# Patient Record
Sex: MALE | Race: AMERICAN INDIAN OR ALASKA NATIVE
[De-identification: names, ages, dates, MRNs, and addresses within clinical notes are randomized per-mention and may not be internally consistent; named-entity substitution may affect disease eponyms.]

---

## 2021-06-14 ENCOUNTER — HOSPITAL ENCOUNTER (INPATIENT)
Dept: HOSPITAL 5 - ED | Age: 21
LOS: 4 days | Discharge: HOME | DRG: 208 | End: 2021-06-18
Attending: FAMILY MEDICINE | Admitting: HOSPITALIST
Payer: SELF-PAY

## 2021-06-14 DIAGNOSIS — E87.8: ICD-10-CM

## 2021-06-14 DIAGNOSIS — J96.01: Primary | ICD-10-CM

## 2021-06-14 DIAGNOSIS — Z20.822: ICD-10-CM

## 2021-06-14 DIAGNOSIS — R73.9: ICD-10-CM

## 2021-06-14 DIAGNOSIS — D72.829: ICD-10-CM

## 2021-06-14 DIAGNOSIS — G40.901: ICD-10-CM

## 2021-06-14 DIAGNOSIS — M62.82: ICD-10-CM

## 2021-06-14 DIAGNOSIS — F12.10: ICD-10-CM

## 2021-06-14 DIAGNOSIS — E87.2: ICD-10-CM

## 2021-06-14 DIAGNOSIS — Z87.820: ICD-10-CM

## 2021-06-14 DIAGNOSIS — N17.9: ICD-10-CM

## 2021-06-14 PROCEDURE — U0003 INFECTIOUS AGENT DETECTION BY NUCLEIC ACID (DNA OR RNA); SEVERE ACUTE RESPIRATORY SYNDROME CORONAVIRUS 2 (SARS-COV-2) (CORONAVIRUS DISEASE [COVID-19]), AMPLIFIED PROBE TECHNIQUE, MAKING USE OF HIGH THROUGHPUT TECHNOLOGIES AS DESCRIBED BY CMS-2020-01-R: HCPCS

## 2021-06-14 PROCEDURE — 80307 DRUG TEST PRSMV CHEM ANLYZR: CPT

## 2021-06-14 PROCEDURE — 95819 EEG AWAKE AND ASLEEP: CPT

## 2021-06-14 PROCEDURE — 70551 MRI BRAIN STEM W/O DYE: CPT

## 2021-06-14 PROCEDURE — 85025 COMPLETE CBC W/AUTO DIFF WBC: CPT

## 2021-06-14 PROCEDURE — 94640 AIRWAY INHALATION TREATMENT: CPT

## 2021-06-14 PROCEDURE — 81001 URINALYSIS AUTO W/SCOPE: CPT

## 2021-06-14 PROCEDURE — 80320 DRUG SCREEN QUANTALCOHOLS: CPT

## 2021-06-14 PROCEDURE — 80053 COMPREHEN METABOLIC PANEL: CPT

## 2021-06-14 PROCEDURE — 85007 BL SMEAR W/DIFF WBC COUNT: CPT

## 2021-06-14 PROCEDURE — 80048 BASIC METABOLIC PNL TOTAL CA: CPT

## 2021-06-14 PROCEDURE — 70450 CT HEAD/BRAIN W/O DYE: CPT

## 2021-06-14 PROCEDURE — 84443 ASSAY THYROID STIM HORMONE: CPT

## 2021-06-14 PROCEDURE — 85027 COMPLETE CBC AUTOMATED: CPT

## 2021-06-14 PROCEDURE — 93005 ELECTROCARDIOGRAM TRACING: CPT

## 2021-06-14 PROCEDURE — 94002 VENT MGMT INPAT INIT DAY: CPT

## 2021-06-14 PROCEDURE — 82550 ASSAY OF CK (CPK): CPT

## 2021-06-14 PROCEDURE — 71045 X-RAY EXAM CHEST 1 VIEW: CPT

## 2021-06-14 PROCEDURE — 74018 RADEX ABDOMEN 1 VIEW: CPT

## 2021-06-14 PROCEDURE — 36415 COLL VENOUS BLD VENIPUNCTURE: CPT

## 2021-06-14 PROCEDURE — 82962 GLUCOSE BLOOD TEST: CPT

## 2021-06-14 PROCEDURE — 94003 VENT MGMT INPAT SUBQ DAY: CPT

## 2021-06-14 PROCEDURE — 82803 BLOOD GASES ANY COMBINATION: CPT

## 2021-06-14 PROCEDURE — 82805 BLOOD GASES W/O2 SATURATION: CPT

## 2021-06-14 PROCEDURE — 36600 WITHDRAWAL OF ARTERIAL BLOOD: CPT

## 2021-06-14 PROCEDURE — G0480 DRUG TEST DEF 1-7 CLASSES: HCPCS
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
## 2021-06-15 LAB
%HYPO/RBC NFR BLD AUTO: (no result) %
ALBUMIN SERPL-MCNC: 4.8 G/DL (ref 3.9–5)
ALT SERPL-CCNC: 23 UNITS/L (ref 7–56)
ANISOCYTOSIS BLD QL SMEAR: (no result)
BACTERIA #/AREA URNS HPF: (no result) /HPF
BAND NEUTROPHILS # (MANUAL): 0 K/MM3
BILIRUB UR QL STRIP: (no result)
BLOOD UR QL VISUAL: (no result)
BUN SERPL-MCNC: 8 MG/DL (ref 9–20)
BUN SERPL-MCNC: 8 MG/DL (ref 9–20)
BUN/CREAT SERPL: 4 %
BUN/CREAT SERPL: 6 %
CALCIUM SERPL-MCNC: 9 MG/DL (ref 8.4–10.2)
CALCIUM SERPL-MCNC: 9.2 MG/DL (ref 8.4–10.2)
HCO3 BLDA-SCNC: 20.9 MMOL/L (ref 20–26)
HCT VFR BLD CALC: 38.7 % (ref 35.5–45.6)
HCT VFR BLD CALC: 38.8 % (ref 35.5–45.6)
HEMOLYSIS INDEX: 26
HEMOLYSIS INDEX: 6
HGB BLD-MCNC: 11.3 GM/DL (ref 11.8–15.2)
HGB BLD-MCNC: 12.2 GM/DL (ref 11.8–15.2)
MCHC RBC AUTO-ENTMCNC: 29 % (ref 32–34)
MCHC RBC AUTO-ENTMCNC: 32 % (ref 32–34)
MCV RBC AUTO: 80 FL (ref 84–94)
MCV RBC AUTO: 88 FL (ref 84–94)
MUCOUS THREADS #/AREA URNS HPF: (no result) /HPF
MYELOCYTES # (MANUAL): 0 K/MM3
PCO2 BLDA: 34 MM HG
PH BLDA: 7.41 PH UNITS (ref 7.35–7.45)
PH UR STRIP: 6 [PH] (ref 5–7)
PLATELET # BLD: 211 K/MM3 (ref 140–440)
PLATELET # BLD: 334 K/MM3 (ref 140–440)
PO2 BLDA: 125.7 MM HG (ref 80–90)
PROMYELOCYTES # (MANUAL): 0 K/MM3
PROT UR STRIP-MCNC: (no result) MG/DL
RBC # BLD AUTO: 4.39 M/MM3 (ref 3.65–5.03)
RBC # BLD AUTO: 4.83 M/MM3 (ref 3.65–5.03)
RBC #/AREA URNS HPF: 4 /HPF (ref 0–6)
TOTAL CELLS COUNTED BLD: 100
TRIPLE PHOSPHATE CRYSTAL,URINE: (no result)
UROBILINOGEN UR-MCNC: < 2 MG/DL (ref ?–2)
WBC #/AREA URNS HPF: 2 /HPF (ref 0–6)

## 2021-06-15 PROCEDURE — 4A033R1 MEASUREMENT OF ARTERIAL SATURATION, PERIPHERAL, PERCUTANEOUS APPROACH: ICD-10-PCS | Performed by: HOSPITALIST

## 2021-06-15 PROCEDURE — 0BH17EZ INSERTION OF ENDOTRACHEAL AIRWAY INTO TRACHEA, VIA NATURAL OR ARTIFICIAL OPENING: ICD-10-PCS | Performed by: HOSPITALIST

## 2021-06-15 PROCEDURE — 5A1945Z RESPIRATORY VENTILATION, 24-96 CONSECUTIVE HOURS: ICD-10-PCS | Performed by: HOSPITALIST

## 2021-06-15 RX ADMIN — HEPARIN SODIUM SCH UNIT: 5000 INJECTION, SOLUTION INTRAVENOUS; SUBCUTANEOUS at 10:09

## 2021-06-15 RX ADMIN — Medication SCH ML: at 21:12

## 2021-06-15 RX ADMIN — INSULIN HUMAN SCH: 100 INJECTION, SOLUTION PARENTERAL at 10:08

## 2021-06-15 RX ADMIN — FAMOTIDINE SCH MG: 10 INJECTION, SOLUTION INTRAVENOUS at 10:07

## 2021-06-15 RX ADMIN — FAMOTIDINE SCH MG: 10 INJECTION, SOLUTION INTRAVENOUS at 21:10

## 2021-06-15 RX ADMIN — IPRATROPIUM BROMIDE AND ALBUTEROL SULFATE SCH AMPUL: .5; 3 SOLUTION RESPIRATORY (INHALATION) at 08:09

## 2021-06-15 RX ADMIN — IPRATROPIUM BROMIDE AND ALBUTEROL SULFATE SCH AMPUL: .5; 3 SOLUTION RESPIRATORY (INHALATION) at 19:58

## 2021-06-15 RX ADMIN — LEVETIRACETAM SCH MLS/HR: 100 INJECTION, SOLUTION INTRAVENOUS at 21:12

## 2021-06-15 RX ADMIN — IPRATROPIUM BROMIDE AND ALBUTEROL SULFATE SCH AMPUL: .5; 3 SOLUTION RESPIRATORY (INHALATION) at 15:18

## 2021-06-15 RX ADMIN — HEPARIN SODIUM SCH UNIT: 5000 INJECTION, SOLUTION INTRAVENOUS; SUBCUTANEOUS at 15:57

## 2021-06-15 RX ADMIN — HEPARIN SODIUM SCH UNIT: 5000 INJECTION, SOLUTION INTRAVENOUS; SUBCUTANEOUS at 21:10

## 2021-06-15 RX ADMIN — INSULIN HUMAN SCH: 100 INJECTION, SOLUTION PARENTERAL at 14:12

## 2021-06-15 RX ADMIN — Medication SCH ML: at 10:33

## 2021-06-15 RX ADMIN — INSULIN HUMAN SCH: 100 INJECTION, SOLUTION PARENTERAL at 18:38

## 2021-06-15 RX ADMIN — LEVETIRACETAM SCH MLS/HR: 100 INJECTION, SOLUTION INTRAVENOUS at 15:57

## 2021-06-15 NOTE — CONSULTATION
History of Present Illness


Consult date: 06/15/21


Requesting physician: RICARDO BAHENA


Reason for consult: hypoxemia, other (status epilepticus)


History of present illness: 





Patient is intubated and sedated so all history comes from the chart.  


19 y/o male with prior history of seizure and TBI (cause unknown) was found down

at home seizing by his mother and continued to seize until EMS got there.  

Aborted at that time with ativan.  Per report was hypoxic in the field so bagged

here.   Once in the ED sats only in the low 90's and ED felt patient needed 

intubation for airway protection.  Sedated with Diprovan and taken to CT.  While

in CT, per nurse, patient continued to seize, on Diprovan and required and 

additional 4 of Ativan to abort.  He was then given Phosphenytoin on top of the 

keppra load that he already recieved.  He is currently sedated and has not had 

any further seizure like activity since then.











Past History


Past Medical History: seizures, other (TBI)


Past Surgical History: Other (unable to obtain)


Social history: other (unable to obtain)


Family history: other (unable to obtain)





Medications and Allergies


                                    Allergies











Allergy/AdvReac Type Severity Reaction Status Date / Time


 


Unable to Assess Allergy   Unverified 08/03/20 11:05











Active Meds: 


Active Medications





Hydrophilic Ointment (Lip Therapy Vaseline)  1 applic TP Q2HR PRN


   PRN Reason: Dry Lips


Sodium Chloride (Nacl 0.9% 1000 Ml)  1,000 mls @ 125 mls/hr IV ONCE ONE


   Stop: 06/15/21 07:33


   Last Admin: 06/15/21 00:23 Dose:  125 mls/hr


   Documented by: 


Propofol (Diprivan 10 Mg/Ml)  1,000 mg in 100 mls @ 2.46 mls/hr IV TITR JO ANN; 

Protocol


   Last Titration: 06/15/21 00:15 Dose:  30 mcg/kg/min, 14.76 mls/hr


   Documented by: 


Multi-Ingred Cream/Lotion/Oil/Oint (Mineral Oil/Petrolatum, White Ophth Oint 3.5

Gm)  1 applic OU Q4HR PRN


   PRN Reason: Dry Eye(s)











Review of Systems


ROS unobtainable: due to endotracheal tube, due to mental status





Physical Examination


Vital signs: 


                                   Vital Signs











Pulse Ox


 


 98 


 


 06/14/21 23:20











General appearance: other (sedated)


ENT: other (orally intubated)


Neck: supple


Effort: normal


Ascultation: Bilateral: clear





Results





- Laboratory Findings


CBC and BMP: 


                                 06/14/21 23:44





                                 06/14/21 23:44


Abnormal lab findings: 


                                  Abnormal Labs











  06/14/21 06/14/21





  23:44 23:44


 


WBC  18.3 H 


 


Hgb  11.3 L 


 


MCH  26 L 


 


MCHC  29 L 


 


RDW  20.7 H 


 


Lymphocytes % (Manual)  36.0 H 


 


Monocytes % (Manual)  8.0 H 


 


Seg Neutrophils # Man  9.5 H 


 


Lymphocytes # (Manual)  6.6 H 


 


Monocytes # (Manual)  1.5 H 


 


Eosinophils # (Manual)  0.5 H 


 


Basophils # (Manual)  0.2 H 


 


Chloride   95.2 L


 


Carbon Dioxide   10 L


 


BUN   8 L


 


Creatinine   1.9 H


 


Glucose   255 H


 


AST   58 H


 


Total Creatine Kinase   1147 H














- Diagnostic Findings


Chest x-ray: image reviewed





Assessment and Plan





19 y/o with prior TBI and seizures admitted with status epilepticus and acute 

respiratory failure.





1.  Needs EEG on and off sedation if possible to make sure seizures have been 

aborted


2.  Neurology consult


3.  Need to speak with mother to get more history regarding meds, exposures, 

compliance etc


4.  RHONDA, likely volume related, or pending how long he was seizing.  Agree with 

volume and trend CK levels


5.  Guarded to poor prognosis.  Per nursing patient was awake but not sure 

exactly what type of wakeness they were dealing with.  Per documentation, 

patient could be have been seizing for an extended period of time.





CCT 31 minutes.

## 2021-06-15 NOTE — XRAY REPORT
CHEST 1 VIEW, 6/14/2021 10:56 PM 



CLINICAL INFORMATION/INDICATION: Endotracheal tube placement



COMPARISON: None.



FINDINGS:



SUPPORT DEVICES: The distal tip of the endotracheal tube is located approximately 5.5 cm above the le
kristin the fariha. An esophagogastric tube is also been placed with distal tip overlying the proximal st
omach.



HEART: The cardiac silhouette is normal in size.



LUNGS/PLEURA: The lungs are clear of focal airspace disease or significant pleural effusion.



ADDITIONAL FINDINGS: No additional acute findings.



IMPRESSION:

1. Placement of endotracheal tube and esophagogastric tube as above.



Signer Name: Jacklyn Ansari MD 

Signed: 6/15/2021 12:29 AM

Workstation Name: [id number]

## 2021-06-15 NOTE — XRAY REPORT
ABDOMEN 1 VIEW



INDICATION / CLINICAL INFORMATION:

NGT placement.



COMPARISON: 

Not available.



FINDINGS:



TUBES / LINES: An NG tube has been placed with the tip projecting over the gastric fundus and the miko
ehole of the tube located over the distal third of the esophagus.  

BOWEL GAS PATTERN: No dilated bowel loops are seen. The colon contains a large amount of stool. 

FREE AIR / EXTRALUMINAL GAS: None seen.



ADDITIONAL FINDINGS: No significant additional findings.



IMPRESSION:

NG tube as above. Advancement of the tube by 5 cm should result in the sidehole of the tube being loc
ated in the stomach.



Signer Name: Eugenio Shepherd MD 

Signed: 6/15/2021 6:04 PM

Workstation Name: [email or web address]

## 2021-06-15 NOTE — CAT SCAN REPORT
Examination: CT of the head without contrast



Clinical information: Seizure. Altered mental status.



Comparison: None



Technical: Multiple axial CT images of the head were obtained without intravenous contrast.  Sagittal
 and coronal reformats were obtained.  All CTs at this facility utilize dose reduction techniques inc
luding automated exposure control, iterative reconstruction and weight based dosing when appropriate 
to reduce patient radiation dose to as low as reasonable achievable.



Findings: 



INTRACRANIAL CONTENTS: There are confluent regions of hypodensity and associated volume loss within t
he bilateral frontal lobes. There is no CT evidence of acute intracranial hemorrhage. There is no zandra
dence of mass effect or midline shift. The ventricular system is normal in size and configuration. 



SKULL: Evaluation of the skull demonstrates previous right frontal convexity craniectomy



ORBITS: The bilateral orbits and globes appear normal



PARANASAL SINUSES / MASTOID AIR CELLS: Paranasal sinuses and mastoid air cells appear clear.



There is a low-density fluid collection soft tissue fluid collection surrounding the right craniectom
y site measuring a maximum of 8 mm in thickness.



Impression:

1.  Confluent regions of hypodensity and volume loss within the bilateral frontal lobes. Findings may
 be secondary to previous trauma or ischemia. Please correlate with patient's clinical circumstances.


2. Postsurgical changes from right convexity craniectomy.



Signer Name: Jacklyn Ansari MD 

Signed: 6/15/2021 3:58 AM

Workstation Name: [id number]

## 2021-06-15 NOTE — XRAY REPORT
CHEST - 1 VIEW 0713 hours



INDICATION:  follow up respiratory failure 



COMPARISON:

Yesterday



FINDINGS:



Support devices:  Stable positioning of the endotracheal tube and nasogastric tube

Heart:  Normal

Lungs/pleura:  Left basilar atelectatic changes have nearly resolved. The lungs are clear otherwise.

Additional findings:  None.



IMPRESSION:  



Near normal AP chest. Minor left basilar atelectasis remains.



Signer Name: Román Woodard Jr, MD 

Signed: 6/15/2021 8:18 AM

Workstation Name: [id number]

## 2021-06-15 NOTE — CONSULTATION
History of Present Illness


Consult date: 06/15/21


Reason for Consult: Status Epilepticus


History of present illness: 





Hypoxemia


Seizure


History of present illness: 





20-year-old -American male with past medical history of seizure and TBI 

was brought to the emergency room because of prolonged seizure activity.  His 

mother came home to find the patient seizing and called for EMS.  He continued 

to seize until EMS arrived and for at least 5 minutes afterwards.  EMS gave the 

patient 2 mg of lorazepam and the seizures stopped.  However the patient had a 

room air pulse ox of about 75% and was exhibiting some shallow breathing so they

began doing bag valve ventilation.  He had an Accu-Chek in route that was about 

200.    


When the patient arrived to the emergency department he was brought into bed #1.

 Without bag valve ventilation the patient's oxygen saturation goes down into 

the low 90s and he has bradypnea with shallow respirations.  The patient was 

intubated for protection of airway.





he is currently intubated sedated with Propofol 30 Mc 


No witnessed seizure 








 Past History 


Past Medical History: seizures, other (TBI)


Past Surgical History: Other (unable to obtain)


Social history: other (unable to obtain)


Family history: other (unable to obtain)





 Medications and Allergies 


                                    Allergies











Allergy/AdvReac Type Severity Reaction Status Date / Time


 


Unable to Assess Allergy   Unverified 08/03/20 11:05











Active Meds: 


Active Medications





Acetaminophen (Acetaminophen 325 Mg Tab)  650 mg PO Q4H PRN


   PRN Reason: Pain MILD(1-3)/Fever >100.5/HA


Albuterol (Albuterol 2.5 Mg/3 Ml Nebu)  2.5 mg IH Q4HRT PRN


   PRN Reason: Shortness Of Breath


Albuterol/Ipratropium (Ipratropium/Albuterol Sulfate 3 Ml Ampul.Neb)  1 ampul IH

Q6HRT JO ANN


Hydrophilic Ointment (Lip Therapy Vaseline)  1 applic TP Q2HR PRN


   PRN Reason: Dry Lips


Sodium Chloride (Nacl 0.9% 1000 Ml)  1,000 mls @ 125 mls/hr IV ONCE ONE


   Stop: 06/15/21 07:33


   Last Admin: 06/15/21 00:23 Dose:  125 mls/hr


   Documented by: 


Propofol (Diprivan 10 Mg/Ml)  1,000 mg in 100 mls @ 2.46 mls/hr IV TITR JO ANN; 

Protocol


   Last Titration: 06/15/21 00:15 Dose:  30 mcg/kg/min, 14.76 mls/hr


   Documented by: 


Multi-Ingred Cream/Lotion/Oil/Oint (Mineral Oil/Petrolatum, White Ophth Oint 3.5

Gm)  1 applic OU Q4HR PRN


   PRN Reason: Dry Eye(s)


Ondansetron HCl (Ondansetron 4 Mg/2 Ml Inj)  4 mg IV Q8H PRN


   PRN Reason: Nausea And Vomiting


Sodium Chloride (Sodium Chloride 0.9% 10 Ml Flush Syringe)  10 ml IV BID JO ANN


Sodium Chloride (Sodium Chloride 0.9% 10 Ml Flush Syringe)  10 ml IV PRN PRN


   PRN Reason: LINE FLUSH











 Review of Systems 


Respiratory: shortness of breath, dyspnea on exertion


Neurological: seizures











Past History


Past Medical History: seizures, other (TBI)


Past Surgical History: Other (unable to obtain)


Social history: other (unable to obtain)


Family history: other (unable to obtain)





Medications and Allergies


                                    Allergies











Allergy/AdvReac Type Severity Reaction Status Date / Time


 


Unable to Assess Allergy   Unverified 08/03/20 11:05











                                Home Medications











 Medication  Instructions  Recorded  Confirmed  Last Taken  Type


 


No Known Home Medications [No  06/15/21 06/15/21 Unknown History





Reported Home Medications]     











Active Meds: 


Active Medications





Acetaminophen (Acetaminophen 325 Mg Tab)  650 mg PO Q4H PRN


   PRN Reason: Pain MILD(1-3)/Fever >100.5/HA


Albuterol (Albuterol 2.5 Mg/3 Ml Nebu)  2.5 mg IH Q4HRT PRN


   PRN Reason: Shortness Of Breath


Albuterol/Ipratropium (Ipratropium/Albuterol Sulfate 3 Ml Ampul.Neb)  1 ampul IH

Q6HRT JO ANN


Famotidine (Famotidine 20 Mg/2 Ml Inj)  20 mg IV QAM JO ANN


Heparin Sodium (Porcine) (Heparin 5,000 Unit/1 Ml Vial)  5,000 unit SUB-Q Q8HR 

JO ANN


Hydralazine HCl (Hydralazine 20 Mg/1 Ml Inj)  10 mg IV Q6H PRN


   PRN Reason: htn


Hydrophilic Ointment (Lip Therapy Vaseline)  1 applic TP Q2HR PRN


   PRN Reason: Dry Lips


Propofol (Diprivan 10 Mg/Ml)  1,000 mg in 100 mls @ 2.46 mls/hr IV TITR JO ANN; 

Protocol


   Last Titration: 06/15/21 00:15 Dose:  30 mcg/kg/min, 14.76 mls/hr


   Documented by: 


Dextrose/Sodium Chloride (D5ns)  1,000 mls @ 100 mls/hr IV AS DIRECT JO ANN


Levetiracetam 500 mg/ Dextrose  105 mls @ 400 mls/hr IV Q12HR JO ANN


Multi-Ingred Cream/Lotion/Oil/Oint (Mineral Oil/Petrolatum, White Ophth Oint 3.5

Gm)  1 applic OU Q4HR PRN


   PRN Reason: Dry Eye(s)


Ondansetron HCl (Ondansetron 4 Mg/2 Ml Inj)  4 mg IV Q8H PRN


   PRN Reason: Nausea And Vomiting


Sodium Chloride (Sodium Chloride 0.9% 10 Ml Flush Syringe)  10 ml IV BID JO ANN


Sodium Chloride (Sodium Chloride 0.9% 10 Ml Flush Syringe)  10 ml IV PRN PRN


   PRN Reason: LINE FLUSH











Physical Examination





- Vital Signs


Vital Signs: 


                                   Vital Signs











Pulse Ox


 


 98 


 


 06/14/21 23:20














- Constitutional


General appearance: comfortable





- EENT


EENT: Present: PERRL





- Respiratory


Respiratory: Present: chest non-tender, lungs clear, rhonchi





- Cardiovascular


Cardiovascular: Present: regular rate, normal S1, normal S2


Extremities: Present: no peripheral edema bilatateraly





- Gastrointestinal


Gastrointestinal: Present: normoactive bowel sounds





- Integumentary


Integumentary: Present: normal





- Neurologic


Cranial nerve examination: PERRL, EOMI, intact


Speech examination: other (intubated and sedated)


Sensorimotor examination: intact


Detailed motor examination: other (slight withdrawal to pain stimuli)





Results





- Laboratory Findings


CBC and BMP: 


                                 06/15/21 10:41





                                 06/15/21 10:41


Abnormal Lab Findings: 


                                  Abnormal Labs











  06/14/21 06/14/21 06/15/21





  23:44 23:44 Unknown


 


WBC  18.3 H  


 


Hgb  11.3 L  


 


MCH  26 L  


 


MCHC  29 L  


 


RDW  20.7 H  


 


Lymphocytes % (Manual)  36.0 H  


 


Monocytes % (Manual)  8.0 H  


 


Seg Neutrophils # Man  9.5 H  


 


Lymphocytes # (Manual)  6.6 H  


 


Monocytes # (Manual)  1.5 H  


 


Eosinophils # (Manual)  0.5 H  


 


Basophils # (Manual)  0.2 H  


 


ABG pO2    125.7 H


 


ABG Base Excess    -3.2 L


 


ABG Hemoglobin    10.6 L


 


Chloride   95.2 L 


 


Carbon Dioxide   10 L 


 


BUN   8 L 


 


Creatinine   1.9 H 


 


Glucose   255 H 


 


AST   58 H 


 


Total Creatine Kinase   1147 H 














Assessment and Plan





 Assessment and Plan 


# Acute respiratory failure


-whether with hypoxia or hypercapnia   


- related to seizure R/O infection


-Admit the patient to the ICU.  Patient is status post intubation.  DuoNeb by 

nebulizer every 4 hours.  Albuterol by nebulizer every 4 hours as needed.  

Patient is seen and evaluated by pulmonary








# Status epilepticus


-Keppra 1000 mg IV x1 dose 


-then 1000 mg IV every 12 hours. 


- Patient also get fosphenytoin 6100 g IV x1 dose. 


- EEG today. 


- Ativan as needed. 


- We also order MRI of the brain w/o IV contrast. 


-Ct brain showed bifrontal hypodensity 





# History of traumatic brain injury


-Stable. 





# Hyperglycemia


-We will monitor the glucose closely.  





# DVT prophylaxis


-Heparin 5000 units subcu every 8 hours for DVT prophylaxis. 


- Pepcid 20 mg IV twice daily for GI prophylaxis. 


- Patient is a full code.

## 2021-06-16 LAB
BASOPHILS # (AUTO): 0 K/MM3 (ref 0–0.1)
BASOPHILS NFR BLD AUTO: 0.3 % (ref 0–1.8)
BUN SERPL-MCNC: 8 MG/DL (ref 9–20)
BUN/CREAT SERPL: 8 %
CALCIUM SERPL-MCNC: 9.1 MG/DL (ref 8.4–10.2)
EOSINOPHIL # BLD AUTO: 0.1 K/MM3 (ref 0–0.4)
EOSINOPHIL NFR BLD AUTO: 1.1 % (ref 0–4.3)
HCT VFR BLD CALC: 34.6 % (ref 35.5–45.6)
HEMOLYSIS INDEX: 10
HGB BLD-MCNC: 11.1 GM/DL (ref 11.8–15.2)
LYMPHOCYTES # BLD AUTO: 0.9 K/MM3 (ref 1.2–5.4)
LYMPHOCYTES NFR BLD AUTO: 8.9 % (ref 13.4–35)
MCHC RBC AUTO-ENTMCNC: 32 % (ref 32–34)
MCV RBC AUTO: 81 FL (ref 84–94)
MONOCYTES # (AUTO): 1.1 K/MM3 (ref 0–0.8)
MONOCYTES % (AUTO): 11.1 % (ref 0–7.3)
PLATELET # BLD: 179 K/MM3 (ref 140–440)
RBC # BLD AUTO: 4.28 M/MM3 (ref 3.65–5.03)

## 2021-06-16 RX ADMIN — Medication SCH ML: at 23:38

## 2021-06-16 RX ADMIN — HALOPERIDOL LACTATE PRN MG: 5 INJECTION, SOLUTION INTRAMUSCULAR at 11:39

## 2021-06-16 RX ADMIN — LEVETIRACETAM SCH MLS/HR: 100 INJECTION, SOLUTION INTRAVENOUS at 11:01

## 2021-06-16 RX ADMIN — LEVETIRACETAM SCH MLS/HR: 100 INJECTION, SOLUTION INTRAVENOUS at 23:43

## 2021-06-16 RX ADMIN — INSULIN HUMAN SCH: 100 INJECTION, SOLUTION PARENTERAL at 00:00

## 2021-06-16 RX ADMIN — IPRATROPIUM BROMIDE AND ALBUTEROL SULFATE SCH: .5; 3 SOLUTION RESPIRATORY (INHALATION) at 21:57

## 2021-06-16 RX ADMIN — IPRATROPIUM BROMIDE AND ALBUTEROL SULFATE SCH AMPUL: .5; 3 SOLUTION RESPIRATORY (INHALATION) at 14:45

## 2021-06-16 RX ADMIN — INSULIN HUMAN SCH: 100 INJECTION, SOLUTION PARENTERAL at 13:36

## 2021-06-16 RX ADMIN — FAMOTIDINE SCH MG: 20 TABLET ORAL at 23:34

## 2021-06-16 RX ADMIN — Medication SCH ML: at 11:02

## 2021-06-16 RX ADMIN — HEPARIN SODIUM SCH UNIT: 5000 INJECTION, SOLUTION INTRAVENOUS; SUBCUTANEOUS at 14:13

## 2021-06-16 RX ADMIN — INSULIN HUMAN SCH: 100 INJECTION, SOLUTION PARENTERAL at 18:26

## 2021-06-16 RX ADMIN — INSULIN HUMAN SCH: 100 INJECTION, SOLUTION PARENTERAL at 06:13

## 2021-06-16 RX ADMIN — HEPARIN SODIUM SCH UNIT: 5000 INJECTION, SOLUTION INTRAVENOUS; SUBCUTANEOUS at 23:34

## 2021-06-16 RX ADMIN — HEPARIN SODIUM SCH UNIT: 5000 INJECTION, SOLUTION INTRAVENOUS; SUBCUTANEOUS at 06:12

## 2021-06-16 RX ADMIN — FAMOTIDINE SCH MG: 20 TABLET ORAL at 11:01

## 2021-06-16 RX ADMIN — IPRATROPIUM BROMIDE AND ALBUTEROL SULFATE SCH AMPUL: .5; 3 SOLUTION RESPIRATORY (INHALATION) at 05:21

## 2021-06-16 RX ADMIN — IPRATROPIUM BROMIDE AND ALBUTEROL SULFATE SCH AMPUL: .5; 3 SOLUTION RESPIRATORY (INHALATION) at 08:50

## 2021-06-16 NOTE — PROGRESS NOTE
<BETHANYNEGRATOVA HMynor - Last Filed: 06/17/21 07:19>





Assessment and Plan


Assessment and plan: 


This is a 20-year-old male with seizures and TBI was monitored for status 

epilepticus, acute respiratory failure, hyperglycemia, acute kidney injury and 

rhabdomyolysis





Status epilepticus


Acute hypoxic respiratory failure, extubated 6/16


Acute kidney injury, improving/resolved


Rhabdomyolysis


Leukocytosis (improved)


Hypochloremia (resolved)


Metabolic acidosis (resolved)


UDS positive for marijuana


COVID 19 PUI, ruled out


Hyperglycemia


Hx of TBI


Hx of Seizures





-CCM, neurology consulted, appreciate recommendations


-Keppra 1000 mg x 1, fosphenytoin 1600 mgx1 in the emergency department


-Keppra 1000 mg twice daily


-Pulmonary hygiene


-Haldol as needed


-Seizure and aspiration precautions


-Continue home lexapro


-Hydralazine PRN


-SSI, Accu-Cheks every 6


-6/14 CT head shows confluent regions of hypodensity and volume loss within the 

bilateral frontal lobes, findings may be secondary to previous trauma or 

ischemia, postsurgical changes from right convexity craniectomy, no evidence of 

mass-effect or midline shift, or acute intracranial hemorrhage


-6/15 MRI Brain pending


-6/16 EEG shows mildly abnormal be due to low voltage which is suggestive of 

encephalopathic process and/or postictal state and/or drug effect.  No epileptif

orm discharges


-6/15 CXR shows some minor left basilar atelectasis





DVT/GI prophylaxis: Heparin subcu, SCDs to bilateral lower extremities, PPI


Disposition: Transfer to floor








The high probability of a clinically significant, sudden or life threatening 

deterioration of the [multi] system(s) required my full and direct attention, 

intervention and personal management. The aggregate critical care time was [35] 

minutes. This time is in addition to time spent performing reported procedures 

but includes the following: 





[x] Data Review and interpretation 





[x] Patient assessment and monitoring of vital signs 





[x] Documentation 





[x] Medication orders and management





History


Interval history: 


This is a 20-year-old male with seizures and TBI presents the emergency 

department on 6/15 after being found down at home seizing by his mother and 

continued to seize until EMS arrival and patient was administered 2 mg of Ativan

 to report seizures.  Patient was hypoxic at this time with shallow breathing 

and was bagged.  On arrival to the emergency department patient was hypoxic to 

the low 90s with shallow respirations and bradypnea and was intubated for airway

 protection.  Patient was sedated with Diprivan and taken to CT and while the 

patient continued to seize and required additional 4 mg of Ativan to abort.  

Patient was loaded with Keppra and received fosphenytoin for continued seizure 

activity.  Lab work revealed leukocytosis, acute kidney injury, metabolic 

acidosis, hyperglycemia and rhabdomyolysis.  Patient was admitted to the 

hospital service with status epilepticus, acute respiratory failure, acute 

kidney injury, hyperglycemia and rhabdomyolysis with consults to [facility] and the [facility] and his 

neurologist.





6/15: Neurology was consulted this morning, MRI brain pending, Ntr consult 

placed. KUB ordered to start TF. EEG completed and per neurology it is remar

kable for diffuse slowing with no eplieptiform discharges noted.





6/16: At the time of examination patient sedated on propofol and on assist 

control with tidal volume 500, rate of 16, PEEP of 6 and 25% FiO2.  Patient was 

extubated in the afternoon.  Patient became aggressive and wanted to leave the 

hospital.  Patient was given Haldol IV and started on as needed Haldol.  Patient

 will be transferred to to the floor. RN to complete bedside swallow eval





Hospitalist Physical





- Constitutional


Vitals: 


                                        











Temp Pulse Resp BP Pulse Ox


 


 98.1 F   97 H  24   108/57   96 


 


 06/16/21 12:12  06/16/21 14:00  06/16/21 14:00  06/16/21 14:00  06/16/21 13:00











General appearance: Present: no acute distress, well-nourished, other (Traumatic

 cephalic)





- EENT


Eyes: Present: PERRL, EOM intact


ENT: clear oral mucosa





- Neck


Neck: Present: normal ROM





- Respiratory


Respiratory effort: normal


Respiratory: bilateral: CTA





- Cardiovascular


Rhythm: regular


Heart Sounds: Present: S1 & S2.  Absent: systolic murmur, diastolic murmur





- Extremities


Extremities: no ischemia, pulses intact, pulses symmetrical, No edema, normal 

temperature, normal color, Full ROM


Peripheral Pulses: within normal limits





- Abdominal


General gastrointestinal: soft, non-tender, non-distended, normal bowel sounds





- Integumentary


Integumentary: Present: warm, dry





- Psychiatric


Psychiatric: agitated





- Neurologic


Neurologic: moves all extremities





- Allied Health


Allied health notes reviewed: nursing, RT, social work





Results





- Labs


CBC & Chem 7: 


                                 06/16/21 06:32





                                 06/16/21 06:32


Labs: 


                             Laboratory Last Values











WBC  10.0 K/mm3 (4.5-11.0)   06/16/21  06:32    


 


RBC  4.28 M/mm3 (3.65-5.03)   06/16/21  06:32    


 


Hgb  11.1 gm/dl (11.8-15.2)  L  06/16/21  06:32    


 


Hct  34.6 % (35.5-45.6)  L  06/16/21  06:32    


 


MCV  81 fl (84-94)  L  06/16/21  06:32    


 


MCH  26 pg (28-32)  L  06/16/21  06:32    


 


MCHC  32 % (32-34)   06/16/21  06:32    


 


RDW  20.3 % (13.2-15.2)  H  06/16/21  06:32    


 


Plt Count  179 K/mm3 (140-440)   06/16/21  06:32    


 


Lymph % (Auto)  8.9 % (13.4-35.0)  L  06/16/21  06:32    


 


Mono % (Auto)  11.1 % (0.0-7.3)  H  06/16/21  06:32    


 


Eos % (Auto)  1.1 % (0.0-4.3)   06/16/21  06:32    


 


Baso % (Auto)  0.3 % (0.0-1.8)   06/16/21  06:32    


 


Lymph # (Auto)  0.9 K/mm3 (1.2-5.4)  L  06/16/21  06:32    


 


Mono # (Auto)  1.1 K/mm3 (0.0-0.8)  H  06/16/21  06:32    


 


Eos # (Auto)  0.1 K/mm3 (0.0-0.4)   06/16/21  06:32    


 


Baso # (Auto)  0.0 K/mm3 (0.0-0.1)   06/16/21  06:32    


 


Add Manual Diff  Complete   06/14/21  23:44    


 


Total Counted  100   06/14/21  23:44    


 


Seg Neutrophils %  78.6 % (40.0-70.0)  H  06/16/21  06:32    


 


Seg Neuts % (Manual)  52.0 % (40.0-70.0)   06/14/21  23:44    


 


Lymphocytes % (Manual)  36.0 % (13.4-35.0)  H  06/14/21  23:44    


 


Monocytes % (Manual)  8.0 % (0.0-7.3)  H  06/14/21  23:44    


 


Eosinophils % (Manual)  3.0 % (0.0-4.3)   06/14/21  23:44    


 


Basophils % (Manual)  1.0 % (0.0-1.8)   06/14/21  23:44    


 


Nucleated RBC %  Not Reportable   06/14/21  23:44    


 


Seg Neutrophils #  7.9 K/mm3 (1.8-7.7)  H  06/16/21  06:32    


 


Seg Neutrophils # Man  9.5 K/mm3 (1.8-7.7)  H  06/14/21  23:44    


 


Band Neutrophils #  0.0 K/mm3  06/14/21  23:44    


 


Lymphocytes # (Manual)  6.6 K/mm3 (1.2-5.4)  H  06/14/21  23:44    


 


Abs React Lymphs (Man)  0.0 K/mm3  06/14/21  23:44    


 


Monocytes # (Manual)  1.5 K/mm3 (0.0-0.8)  H  06/14/21  23:44    


 


Eosinophils # (Manual)  0.5 K/mm3 (0.0-0.4)  H  06/14/21  23:44    


 


Basophils # (Manual)  0.2 K/mm3 (0.0-0.1)  H  06/14/21  23:44    


 


Metamyelocytes #  0.0 K/mm3  06/14/21  23:44    


 


Myelocytes #  0.0 K/mm3  06/14/21  23:44    


 


Promyelocytes #  0.0 K/mm3  06/14/21  23:44    


 


Blast Cells #  0.0 K/mm3  06/14/21  23:44    


 


WBC Morphology  Not Reportable   06/14/21  23:44    


 


Hypersegmented Neuts  Not Reportable   06/14/21  23:44    


 


Hyposegmented Neuts  Not Reportable   06/14/21  23:44    


 


Hypogranular Neuts  Not Reportable   06/14/21  23:44    


 


Smudge Cells  Not Reportable   06/14/21  23:44    


 


Toxic Granulation  Not Reportable   06/14/21  23:44    


 


Toxic Vacuolation  Not Reportable   06/14/21  23:44    


 


Dohle Bodies  Not Reportable   06/14/21  23:44    


 


Pelger-Huet Anomaly  Not Reportable   06/14/21  23:44    


 


Verona Rods  Not Reportable   06/14/21  23:44    


 


Platelet Estimate  Not Reportable   06/14/21  23:44    


 


Clumped Platelets  Not Reportable   06/14/21  23:44    


 


Plt Clumps, EDTA  Not Reportable   06/14/21  23:44    


 


Large Platelets  Not Reportable   06/14/21  23:44    


 


Giant Platelets  Not Reportable   06/14/21  23:44    


 


Platelet Satelliting  Not Reportable   06/14/21  23:44    


 


Plt Morphology Comment  Not Reportable   06/14/21  23:44    


 


RBC Morphology  Not Reportable   06/14/21  23:44    


 


Dimorphic RBCs  Not Reportable   06/14/21  23:44    


 


Polychromasia  Not Reportable   06/14/21  23:44    


 


Hypochromasia  1+   06/14/21  23:44    


 


Poikilocytosis  Not Reportable   06/14/21  23:44    


 


Anisocytosis  1+   06/14/21  23:44    


 


Microcytosis  Few   06/14/21  23:44    


 


Macrocytosis  Not Reportable   06/14/21  23:44    


 


Spherocytes  Not Reportable   06/14/21  23:44    


 


Pappenheimer Bodies  Not Reportable   06/14/21  23:44    


 


Sickle Cells  Not Reportable   06/14/21  23:44    


 


Target Cells  Not Reportable   06/14/21  23:44    


 


Tear Drop Cells  Not Reportable   06/14/21  23:44    


 


Ovalocytes  Not Reportable   06/14/21  23:44    


 


Helmet Cells  Not Reportable   06/14/21  23:44    


 


[place] Bodies  Not Reportable   06/14/21  23:44    


 


Cabot Rings  Not Reportable   06/14/21  23:44    


 


Brigid Cells  Not Reportable   06/14/21  23:44    


 


Bite Cells  Not Reportable   06/14/21  23:44    


 


Crenated Cell  Not Reportable   06/14/21  23:44    


 


Elliptocytes  Not Reportable   06/14/21  23:44    


 


Acanthocytes (Spur)  Not Reportable   06/14/21  23:44    


 


Rouleaux  Not Reportable   06/14/21  23:44    


 


Hemoglobin C Crystals  Not Reportable   06/14/21  23:44    


 


Schistocytes  Not Reportable   06/14/21  23:44    


 


Malaria parasites  Not Reportable   06/14/21  23:44    


 


Mann Bodies  Not Reportable   06/14/21  23:44    


 


Hem Pathologist Commnt  No   06/14/21  23:44    


 


ABG pH  7.406 pH Units (7.350-7.450)   06/15/21  Unknown


 


ABG pCO2  34.0 mm Hg  06/15/21  Unknown


 


ABG pO2  125.7 mm Hg (80.0-90.0)  H  06/15/21  Unknown


 


ABG HCO3  20.9 mmol/L (20.0-26.0)   06/15/21  Unknown


 


ABG O2 Saturation  98.5 % (95.0-99.0)   06/15/21  Unknown


 


ABG O2 Content  14.5  (0.0-44)   06/15/21  Unknown


 


ABG Base Excess  -3.2 mmol/L (-2.0-3.0)  L  06/15/21  Unknown


 


ABG Hemoglobin  10.6 gm/dl (14.0-18.0)  L  06/15/21  Unknown


 


ABG Carboxyhemoglobin  1.7 % (0.0-5.0)   06/15/21  Unknown


 


ABG Methemoglobin  0.5 % (0.0-1.5)   06/15/21  Unknown


 


Oxyhemoglobin  96.3 % (95.0-99.0)   06/15/21  Unknown


 


FiO2  21 %  06/15/21  Unknown


 


Sodium  142 mmol/L (137-145)   06/16/21  06:32    


 


Potassium  4.0 mmol/L (3.6-5.0)   06/16/21  06:32    


 


Chloride  105.9 mmol/L ()   06/16/21  06:32    


 


Carbon Dioxide  25 mmol/L (22-30)   06/16/21  06:32    


 


Anion Gap  15 mmol/L  06/16/21  06:32    


 


BUN  8 mg/dL (9-20)  L  06/16/21  06:32    


 


Creatinine  1.0 mg/dL (0.8-1.3)   06/16/21  06:32    


 


Estimated GFR  > 60 ml/min  06/16/21  06:32    


 


BUN/Creatinine Ratio  8 %  06/16/21  06:32    


 


Glucose  106 mg/dL ()  H  06/16/21  06:32    


 


POC Glucose  120 mg/dL ()  H  06/16/21  05:18    


 


Calcium  9.1 mg/dL (8.4-10.2)   06/16/21  06:32    


 


Total Bilirubin  0.40 mg/dL (0.1-1.2)   06/14/21  23:44    


 


AST  58 units/L (5-40)  H  06/14/21  23:44    


 


ALT  23 units/L (7-56)   06/14/21  23:44    


 


Alkaline Phosphatase  78 units/L ()   06/14/21  23:44    


 


Total Creatine Kinase  1147 units/L ()  H  06/14/21  23:44    


 


Total Protein  7.8 g/dL (6.3-8.2)   06/14/21  23:44    


 


Albumin  4.8 g/dL (3.9-5)   06/14/21  23:44    


 


Albumin/Globulin Ratio  1.6 %  06/14/21  23:44    


 


TSH  3.550 mlU/mL (0.270-4.200)   06/14/21  23:44    


 


Urine Color  Straw  (Yellow)   06/15/21  Unknown


 


Urine Turbidity  Slightly-cloudy  (Clear)   06/15/21  Unknown


 


Urine pH  6.0  (5.0-7.0)   06/15/21  Unknown


 


Ur Specific Gravity  1.006  (1.003-1.030)   06/15/21  Unknown


 


Urine Protein  <15 mg/dl mg/dL (Negative)   06/15/21  Unknown


 


Urine Glucose (UA)  Neg mg/dL (Negative)   06/15/21  Unknown


 


Urine Ketones  Neg mg/dL (Negative)   06/15/21  Unknown


 


Urine Blood  Mod  (Negative)   06/15/21  Unknown


 


Urine Nitrite  Neg  (Negative)   06/15/21  Unknown


 


Urine Bilirubin  Neg  (Negative)   06/15/21  Unknown


 


Urine Urobilinogen  < 2.0 mg/dL (<2.0)   06/15/21  Unknown


 


Ur Leukocyte Esterase  Neg  (Negative)   06/15/21  Unknown


 


Urine WBC (Auto)  2.0 /HPF (0.0-6.0)   06/15/21  Unknown


 


Urine RBC (Auto)  4.0 /HPF (0.0-6.0)   06/15/21  Unknown


 


Urine Bacteria (Auto)  1+ /HPF (Negative)   06/15/21  Unknown


 


Triple Phos Crystals  Few   06/15/21  Unknown


 


Urine Mucus  Few /HPF  06/15/21  Unknown


 


Urine Opiates Screen  Negative   06/15/21  Unknown


 


Urine Methadone Screen  Negative   06/15/21  Unknown


 


Ur Barbiturates Screen  Negative   06/15/21  Unknown


 


Ur Phencyclidine Scrn  Negative   06/15/21  Unknown


 


Ur Amphetamines Screen  Negative   06/15/21  Unknown


 


U Benzodiazepines Scrn  Negative   06/15/21  Unknown


 


Urine Cocaine Screen  Negative   06/15/21  Unknown


 


U Marijuana (THC) Screen  Presumptive positive   06/15/21  Unknown


 


Drugs of Abuse Note  Disclamer   06/15/21  Unknown


 


Plasma/Serum Alcohol  < 0.01 % (0-0.07)   06/14/21  23:44    


 


Coronavirus (PCR)  Negative  (Negative)   06/15/21  Unknown











Tom/IV: 


                                        





Voiding Method                   Indwelling Catheter











Active Medications





- Current Medications


Current Medications: 














Generic Name Dose Route Start Last Admin





  Trade Name Freq  PRN Reason Stop Dose Admin


 


Acetaminophen  650 mg  06/15/21 05:37 





  Acetaminophen 325 Mg Tab  PO  





  Q4H PRN  





  Pain MILD(1-3)/Fever >100.5/HA  


 


Albuterol  2.5 mg  06/15/21 05:37 





  Albuterol 2.5 Mg/3 Ml Nebu  IH  





  Q4HRT PRN  





  Shortness Of Breath  


 


Albuterol/Ipratropium  1 ampul  06/15/21 08:00  06/16/21 08:50





  Ipratropium/Albuterol Sulfate 3 Ml Ampul.Neb  IH   1 ampul





  Q6HRT JO ANN   Administration


 


Lipase/Protease/Amylase  1 each  06/15/21 12:05 





  Lipase 10,500/Protease 25,000/Amylase 43,750 (Units) Dr Esparza  FEEDTUBE  





  PRN PRN  





  For Clogged Feeding Tube  


 


Dextrose  50 ml  06/15/21 08:09 





  Dextrose 50% In Water (25gm) 50 Ml Syringe  IV  





  Q30MIN PRN  





  Hypoglycemia  





  Protocol  


 


Famotidine  20 mg  06/16/21 10:00  06/16/21 11:01





  Famotidine 20 Mg Tab  PO   20 mg





  BID JO ANN   Administration


 


Haloperidol Lactate  5 mg  06/16/21 11:19  06/16/21 11:39





  Haloperidol Lactate 5 Mg/1 Ml Inj  IV   5 mg





  Q6H PRN   Administration





  Agitation  


 


Heparin Sodium (Porcine)  5,000 unit  06/15/21 06:00  06/16/21 14:13





  Heparin 5,000 Unit/1 Ml Vial  SUB-Q   5,000 unit





  Q8HR JO ANN   Administration


 


Hydralazine HCl  10 mg  06/15/21 05:40 





  Hydralazine 20 Mg/1 Ml Inj  IV  





  Q6H PRN  





  htn  


 


Hydrophilic Ointment  1 applic  06/14/21 23:35 





  Lip Therapy Vaseline  TP  





  Q2HR PRN  





  Dry Lips  


 


Levetiracetam 1,000 mg/  110 mls @ 400 mls/hr  06/15/21 15:00  06/16/21 11:01





  Dextrose  IV   400 mls/hr





  Q12HR JO ANN   Administration


 


Insulin Human Regular  0 units  06/15/21 09:00  06/16/21 13:36





  Insulin Regular, Human 100 Units/1 Ml  SUB-Q   Not Given





  Q6HR [facility]  





  Protocol  


 


Lorazepam  2 mg  06/15/21 07:51  06/16/21 06:14





  Lorazepam 2 Mg/Ml Vial  IV   2 mg





  Q4H PRN   Administration





  AGITATION  


 


Miscellaneous Medication  5 mg  06/17/21 10:00 





  Lexapro  PO  





  DAILY [facility]  


 


Multi-Ingred Cream/Lotion/Oil/Oint  1 applic  06/14/21 23:35 





  Mineral Oil/Petrolatum, White Ophth Oint 3.5 Gm  OU  





  Q4HR PRN  





  Dry Eye(s)  


 


Ondansetron HCl  4 mg  06/15/21 05:37 





  Ondansetron 4 Mg/2 Ml Inj  IV  





  Q8H PRN  





  Nausea And Vomiting  


 


Simple Syrup  15 ml  06/15/21 12:05 





  Simple Syrup 15 Ml  FEEDTUBE  





  PRN PRN  





  Hypoglycemia  


 


Simple Syrup  30 ml  06/15/21 12:05 





  Simple Syrup 15 Ml  FEEDTUBE  





  PRN PRN  





  Hypoglycemia  


 


Sodium Bicarbonate  325 mg  06/15/21 12:05 





  Sodium Bicarbonate 325 Mg Tab  FEEDTUBE  





  PRN PRN  





  For Clogged Feeding Tube  


 


Sodium Chloride  10 ml  06/15/21 10:00  06/16/21 11:02





  Sodium Chloride 0.9% 10 Ml Flush Syringe  IV   10 ml





  BID JO ANN   Administration


 


Sodium Chloride  10 ml  06/15/21 05:37 





  Sodium Chloride 0.9% 10 Ml Flush Syringe  IV  





  PRN PRN  





  LINE FLUSH  














Nutrition/Malnutrition Assess





- Dietary Evaluation


Nutrition/Malnutrition Findings: 


                                        





Nutrition Notes                                            Start:  06/15/21 

07:32


Freq:                                                      Status: Active       

 


Protocol:                                                                       

 


 Document     06/15/21 07:32  MK  (Rec: 06/15/21 07:37    [id number])


 Nutrition Notes


     Need for Assessment generated from:         MD Order


     Initial or Follow up                        Assessment


     Current Diagnosis                           Respiratory Failure


     Other Pertinent Diagnosis                   seizure disorder, hx TBI


     Current Diet                                NPO


     Labs/Tests                                  BUN 8


                                                 Cr 1.9


                                                 


                                                 AST 58


     Pertinent Medications                       Propofol at 14.76 ml/hr


                                                 NS at 125 ml/hr


     Height                                      5 ft 10 in


     Weight                                      82 kg


     Ideal Body Weight (kg)                      75.45


     BMI                                         25.9


     Weight Status                               Appropriate


     Subjective/Other Information                MD order to evaluate


                                                 nutritional intake and for TPN


                                                 . Pt on vent and on hold in ED


                                                 . Per MD [place], TPN consult


                                                 an error; pt likely extubated


                                                 tomorrow, however, can start


                                                 TF.


     Burn                                        Absent


     Trauma                                      Absent


     Current % PO                                Negligible


     Minimum of two criteria                     No physical signs of


                                                 malnutrition


     #1


      Nutrition Diagnosis                        Inadequate oral intake


      Etiology                                   ARF


      As Evidenced by Signs and Symptoms         Pt on vent and unable to


                                                 consume PO


     Is patient on ventilator?                   Yes


     Is Patient Ambulatory and/or Out of Bed     No


     REE-([facility]-confined to bed)      [phone]


     Calculation Used for Recommendations        [facility]


     Additional Notes                            Protein: (1.2-2g/kg) 98-164g


                                                 Fluid: 1 ml/kcal


 Nutrition Intervention


     Change Diet Order:                          Start TF when able


     Nutrition Support:                          Osmolite 1.5 at 60ml/hr


                                                 Flush 175 ml q4h


     Kcal                                        2,160


     Protein (gm)                                90


     Carbohydrates (gm)                          293


     Fat (gm)                                    71


     Fluid (mL)                                  1,097


     Goal #1                                     Start TF or extubation


     Anticipated Discharge Needs:                Unable to determine at this


                                                 time


     Follow-Up By:                               06/17/21


     Additional Comments                         FU for TF start/tolerance or


                                                 extubation and BG, renal labs











<MICHEAL PEACE - Last Filed: 06/17/21 10:51>





Assessment and Plan


Assessment and plan: 





Agree with assessment and plan as outlined by nurse practitioner's note as 

above.  I have seen and examined the patient myself.  Patient very lethargic 

after extubation.  Patient is arousable.  Mother at the bedside, spoke with the 

mother pertaining to the patient's history.  Patient never discharged from the 

hospital with seizure medication.  Patient's mother states he has been having 

seizures even in his sleep.  Currently on Keppra, will continue, MRI is pending.

  Neurology following.  Patient is stable to be transferred to the floor.





Hospitalist Physical





- Constitutional


Vitals: 


                                        











Temp Pulse Resp BP Pulse Ox


 


 97.3 F L  81   18   108/62   99 


 


 06/17/21 08:04  06/17/21 08:04  06/17/21 08:04  06/17/21 08:04  06/17/21 08:04














Results





- Labs


CBC & Chem 7: 


                                 06/16/21 06:32





                                 06/16/21 06:32


Labs: 


                             Laboratory Last Values











WBC  10.0 K/mm3 (4.5-11.0)   06/16/21  06:32    


 


RBC  4.28 M/mm3 (3.65-5.03)   06/16/21  06:32    


 


Hgb  11.1 gm/dl (11.8-15.2)  L  06/16/21  06:32    


 


Hct  34.6 % (35.5-45.6)  L  06/16/21  06:32    


 


MCV  81 fl (84-94)  L  06/16/21  06:32    


 


MCH  26 pg (28-32)  L  06/16/21  06:32    


 


MCHC  32 % (32-34)   06/16/21  06:32    


 


RDW  20.3 % (13.2-15.2)  H  06/16/21  06:32    


 


Plt Count  179 K/mm3 (140-440)   06/16/21  06:32    


 


Lymph % (Auto)  8.9 % (13.4-35.0)  L  06/16/21  06:32    


 


Mono % (Auto)  11.1 % (0.0-7.3)  H  06/16/21  06:32    


 


Eos % (Auto)  1.1 % (0.0-4.3)   06/16/21  06:32    


 


Baso % (Auto)  0.3 % (0.0-1.8)   06/16/21  06:32    


 


Lymph # (Auto)  0.9 K/mm3 (1.2-5.4)  L  06/16/21  06:32    


 


Mono # (Auto)  1.1 K/mm3 (0.0-0.8)  H  06/16/21  06:32    


 


Eos # (Auto)  0.1 K/mm3 (0.0-0.4)   06/16/21  06:32    


 


Baso # (Auto)  0.0 K/mm3 (0.0-0.1)   06/16/21  06:32    


 


Add Manual Diff  Complete   06/14/21  23:44    


 


Total Counted  100   06/14/21  23:44    


 


Seg Neutrophils %  78.6 % (40.0-70.0)  H  06/16/21  06:32    


 


Seg Neuts % (Manual)  52.0 % (40.0-70.0)   06/14/21  23:44    


 


Lymphocytes % (Manual)  36.0 % (13.4-35.0)  H  06/14/21  23:44    


 


Monocytes % (Manual)  8.0 % (0.0-7.3)  H  06/14/21  23:44    


 


Eosinophils % (Manual)  3.0 % (0.0-4.3)   06/14/21  23:44    


 


Basophils % (Manual)  1.0 % (0.0-1.8)   06/14/21  23:44    


 


Nucleated RBC %  Not Reportable   06/14/21  23:44    


 


Seg Neutrophils #  7.9 K/mm3 (1.8-7.7)  H  06/16/21  06:32    


 


Seg Neutrophils # Man  9.5 K/mm3 (1.8-7.7)  H  06/14/21  23:44    


 


Band Neutrophils #  0.0 K/mm3  06/14/21  23:44    


 


Lymphocytes # (Manual)  6.6 K/mm3 (1.2-5.4)  H  06/14/21  23:44    


 


Abs React Lymphs (Man)  0.0 K/mm3  06/14/21  23:44    


 


Monocytes # (Manual)  1.5 K/mm3 (0.0-0.8)  H  06/14/21  23:44    


 


Eosinophils # (Manual)  0.5 K/mm3 (0.0-0.4)  H  06/14/21  23:44    


 


Basophils # (Manual)  0.2 K/mm3 (0.0-0.1)  H  06/14/21  23:44    


 


Metamyelocytes #  0.0 K/mm3  06/14/21  23:44    


 


Myelocytes #  0.0 K/mm3  06/14/21  23:44    


 


Promyelocytes #  0.0 K/mm3  06/14/21  23:44    


 


Blast Cells #  0.0 K/mm3  06/14/21  23:44    


 


WBC Morphology  Not Reportable   06/14/21  23:44    


 


Hypersegmented Neuts  Not Reportable   06/14/21  23:44    


 


Hyposegmented Neuts  Not Reportable   06/14/21  23:44    


 


Hypogranular Neuts  Not Reportable   06/14/21  23:44    


 


Smudge Cells  Not Reportable   06/14/21  23:44    


 


Toxic Granulation  Not Reportable   06/14/21  23:44    


 


Toxic Vacuolation  Not Reportable   06/14/21  23:44    


 


Dohle Bodies  Not Reportable   06/14/21  23:44    


 


Pelger-Huet Anomaly  Not Reportable   06/14/21  23:44    


 


[place] Rods  Not Reportable   06/14/21  23:44    


 


Platelet Estimate  Not Reportable   06/14/21  23:44    


 


Clumped Platelets  Not Reportable   06/14/21  23:44    


 


Plt Clumps, EDTA  Not Reportable   06/14/21  23:44    


 


Large Platelets  Not Reportable   06/14/21  23:44    


 


Giant Platelets  Not Reportable   06/14/21  23:44    


 


Platelet Satelliting  Not Reportable   06/14/21  23:44    


 


Plt Morphology Comment  Not Reportable   06/14/21  23:44    


 


RBC Morphology  Not Reportable   06/14/21  23:44    


 


Dimorphic RBCs  Not Reportable   06/14/21  23:44    


 


Polychromasia  Not Reportable   06/14/21  23:44    


 


Hypochromasia  1+   06/14/21  23:44    


 


Poikilocytosis  Not Reportable   06/14/21  23:44    


 


Anisocytosis  1+   06/14/21  23:44    


 


Microcytosis  Few   06/14/21  23:44    


 


Macrocytosis  Not Reportable   06/14/21  23:44    


 


Spherocytes  Not Reportable   06/14/21  23:44    


 


Pappenheimer Bodies  Not Reportable   06/14/21  23:44    


 


Sickle Cells  Not Reportable   06/14/21  23:44    


 


Target Cells  Not Reportable   06/14/21  23:44    


 


Tear Drop Cells  Not Reportable   06/14/21  23:44    


 


Ovalocytes  Not Reportable   06/14/21  23:44    


 


Helmet Cells  Not Reportable   06/14/21  23:44    


 


[place] Bodies  Not Reportable   06/14/21  23:44    


 


Cabot Rings  Not Reportable   06/14/21  23:44    


 


Brigid Cells  Not Reportable   06/14/21  23:44    


 


Bite Cells  Not Reportable   06/14/21  23:44    


 


Crenated Cell  Not Reportable   06/14/21  23:44    


 


Elliptocytes  Not Reportable   06/14/21  23:44    


 


Acanthocytes (Spur)  Not Reportable   06/14/21  23:44    


 


Rouleaux  Not Reportable   06/14/21  23:44    


 


Hemoglobin C Crystals  Not Reportable   06/14/21  23:44    


 


Schistocytes  Not Reportable   06/14/21  23:44    


 


Malaria parasites  Not Reportable   06/14/21  23:44    


 


Mann Bodies  Not Reportable   06/14/21  23:44    


 


Hem Pathologist Commnt  No   06/14/21  23:44    


 


ABG pH  7.406 pH Units (7.350-7.450)   06/15/21  Unknown


 


ABG pCO2  34.0 mm Hg  06/15/21  Unknown


 


ABG pO2  125.7 mm Hg (80.0-90.0)  H  06/15/21  Unknown


 


ABG HCO3  20.9 mmol/L (20.0-26.0)   06/15/21  Unknown


 


ABG O2 Saturation  98.5 % (95.0-99.0)   06/15/21  Unknown


 


ABG O2 Content  14.5  (0.0-44)   06/15/21  Unknown


 


ABG Base Excess  -3.2 mmol/L (-2.0-3.0)  L  06/15/21  Unknown


 


ABG Hemoglobin  10.6 gm/dl (14.0-18.0)  L  06/15/21  Unknown


 


ABG Carboxyhemoglobin  1.7 % (0.0-5.0)   06/15/21  Unknown


 


ABG Methemoglobin  0.5 % (0.0-1.5)   06/15/21  Unknown


 


Oxyhemoglobin  96.3 % (95.0-99.0)   06/15/21  Unknown


 


FiO2  21 %  06/15/21  Unknown


 


Sodium  142 mmol/L (137-145)   06/16/21  06:32    


 


Potassium  4.0 mmol/L (3.6-5.0)   06/16/21  06:32    


 


Chloride  105.9 mmol/L ()   06/16/21  06:32    


 


Carbon Dioxide  25 mmol/L (22-30)   06/16/21  06:32    


 


Anion Gap  15 mmol/L  06/16/21  06:32    


 


BUN  8 mg/dL (9-20)  L  06/16/21  06:32    


 


Creatinine  1.0 mg/dL (0.8-1.3)   06/16/21  06:32    


 


Estimated GFR  > 60 ml/min  06/16/21  06:32    


 


BUN/Creatinine Ratio  8 %  06/16/21  06:32    


 


Glucose  106 mg/dL ()  H  06/16/21  06:32    


 


POC Glucose  83 mg/dL ()   06/17/21  05:55    


 


Calcium  9.1 mg/dL (8.4-10.2)   06/16/21  06:32    


 


Total Bilirubin  0.40 mg/dL (0.1-1.2)   06/14/21  23:44    


 


AST  58 units/L (5-40)  H  06/14/21  23:44    


 


ALT  23 units/L (7-56)   06/14/21  23:44    


 


Alkaline Phosphatase  78 units/L ()   06/14/21  23:44    


 


Total Creatine Kinase  1147 units/L ()  H  06/14/21  23:44    


 


Total Protein  7.8 g/dL (6.3-8.2)   06/14/21  23:44    


 


Albumin  4.8 g/dL (3.9-5)   06/14/21  23:44    


 


Albumin/Globulin Ratio  1.6 %  06/14/21  23:44    


 


TSH  3.550 mlU/mL (0.270-4.200)   06/14/21  23:44    


 


Urine Color  Straw  (Yellow)   06/15/21  Unknown


 


Urine Turbidity  Slightly-cloudy  (Clear)   06/15/21  Unknown


 


Urine pH  6.0  (5.0-7.0)   06/15/21  Unknown


 


Ur Specific Gravity  1.006  (1.003-1.030)   06/15/21  Unknown


 


Urine Protein  <15 mg/dl mg/dL (Negative)   06/15/21  Unknown


 


Urine Glucose (UA)  Neg mg/dL (Negative)   06/15/21  Unknown


 


Urine Ketones  Neg mg/dL (Negative)   06/15/21  Unknown


 


Urine Blood  Mod  (Negative)   06/15/21  Unknown


 


Urine Nitrite  Neg  (Negative)   06/15/21  Unknown


 


Urine Bilirubin  Neg  (Negative)   06/15/21  Unknown


 


Urine Urobilinogen  < 2.0 mg/dL (<2.0)   06/15/21  Unknown


 


Ur Leukocyte Esterase  Neg  (Negative)   06/15/21  Unknown


 


Urine WBC (Auto)  2.0 /HPF (0.0-6.0)   06/15/21  Unknown


 


Urine RBC (Auto)  4.0 /HPF (0.0-6.0)   06/15/21  Unknown


 


Urine Bacteria (Auto)  1+ /HPF (Negative)   06/15/21  Unknown


 


Triple Phos Crystals  Few   06/15/21  Unknown


 


Urine Mucus  Few /HPF  06/15/21  Unknown


 


Urine Opiates Screen  Negative   06/15/21  Unknown


 


Urine Methadone Screen  Negative   06/15/21  Unknown


 


Ur Barbiturates Screen  Negative   06/15/21  Unknown


 


Ur Phencyclidine Scrn  Negative   06/15/21  Unknown


 


Ur Amphetamines Screen  Negative   06/15/21  Unknown


 


U Benzodiazepines Scrn  Negative   06/15/21  Unknown


 


Urine Cocaine Screen  Negative   06/15/21  Unknown


 


U Marijuana (THC) Screen  Presumptive positive   06/15/21  Unknown


 


Drugs of Abuse Note  Disclamer   06/15/21  Unknown


 


Plasma/Serum Alcohol  < 0.01 % (0-0.07)   06/14/21  23:44    


 


Coronavirus (PCR)  Negative  (Negative)   06/15/21  Unknown











Tom/IV: 


                                        





Voiding Method                   Incontinent











Active Medications





- Current Medications


Current Medications: 














Generic Name Dose Route Start Last Admin





  Trade Name Freq  PRN Reason Stop Dose Admin


 


Acetaminophen  650 mg  06/15/21 05:37 





  Acetaminophen 325 Mg Tab  PO  





  Q4H PRN  





  Pain MILD(1-3)/Fever >100.5/HA  


 


Albuterol  2.5 mg  06/15/21 05:37 





  Albuterol 2.5 Mg/3 Ml Nebu  IH  





  Q4HRT PRN  





  Shortness Of Breath  


 


Dextrose  50 ml  06/15/21 08:09 





  Dextrose 50% In Water (25gm) 50 Ml Syringe  IV  





  Q30MIN PRN  





  Hypoglycemia  





  Protocol  


 


Escitalopram Oxalate  5 mg  06/17/21 10:00  06/17/21 09:31





  Escitalopram 10 Mg Tab  PO   5 mg





  DAILY JO ANN   Administration


 


Famotidine  20 mg  06/16/21 10:00  06/17/21 09:31





  Famotidine 20 Mg Tab  PO   20 mg





  BID JO ANN   Administration


 


Haloperidol Lactate  5 mg  06/16/21 11:19  06/17/21 09:39





  Haloperidol Lactate 5 Mg/1 Ml Inj  IV   5 mg





  Q6H PRN   Administration





  Agitation  


 


Heparin Sodium (Porcine)  5,000 unit  06/15/21 06:00  06/17/21 06:10





  Heparin 5,000 Unit/1 Ml Vial  SUB-Q   5,000 unit





  Q8HR JO ANN   Administration


 


Hydralazine HCl  10 mg  06/15/21 05:40 





  Hydralazine 20 Mg/1 Ml Inj  IV  





  Q6H PRN  





  htn  


 


Hydrophilic Ointment  1 applic  06/14/21 23:35 





  Lip Therapy Vaseline  TP  





  Q2HR PRN  





  Dry Lips  


 


Levetiracetam 1,000 mg/  110 mls @ 400 mls/hr  06/15/21 15:00  06/17/21 10:05





  Dextrose  IV   400 mls/hr





  Q12HR JO ANN   Administration


 


Insulin Human Regular  0 units  06/15/21 09:00  06/17/21 06:08





  Insulin Regular, Human 100 Units/1 Ml  SUB-Q   Not Given





  Q6HR [facility]  





  Protocol  


 


Lorazepam  2 mg  06/15/21 07:51  06/16/21 23:49





  Lorazepam 2 Mg/Ml Vial  IV   2 mg





  Q4H PRN   Administration





  AGITATION  


 


Multi-Ingred Cream/Lotion/Oil/Oint  1 applic  06/14/21 23:35 





  Mineral Oil/Petrolatum, White Ophth Oint 3.5 Gm  OU  





  Q4HR PRN  





  Dry Eye(s)  


 


Ondansetron HCl  4 mg  06/15/21 05:37 





  Ondansetron 4 Mg/2 Ml Inj  IV  





  Q8H PRN  





  Nausea And Vomiting  


 


Sodium Chloride  10 ml  06/15/21 10:00  06/17/21 09:32





  Sodium Chloride 0.9% 10 Ml Flush Syringe  IV   10 ml





  BID JO ANN   Administration


 


Sodium Chloride  10 ml  06/15/21 05:37 





  Sodium Chloride 0.9% 10 Ml Flush Syringe  IV  





  PRN PRN  





  LINE FLUSH  














Nutrition/Malnutrition Assess





- Dietary Evaluation


Nutrition/Malnutrition Findings: 


                                        





Nutrition Notes                                            Start:  06/15/21 

07:32


Freq:                                                      Status: Active       

 


Protocol:                                                                       

 


 Document     06/15/21 07:32    (Rec: 06/15/21 07:37    [id number])


 Nutrition Notes


     Need for Assessment generated from:         MD Order


     Initial or Follow up                        Assessment


     Current Diagnosis                           Respiratory Failure


     Other Pertinent Diagnosis                   seizure disorder, hx TBI


     Current Diet                                NPO


     Labs/Tests                                  BUN 8


                                                 Cr 1.9


                                                 


                                                 AST 58


     Pertinent Medications                       Propofol at 14.76 ml/hr


                                                 NS at 125 ml/hr


     Height                                      5 ft 10 in


     Weight                                      82 kg


     Ideal Body Weight (kg)                      75.45


     BMI                                         25.9


     Weight Status                               Appropriate


     Subjective/Other Information                MD order to evaluate


                                                 nutritional intake and for TPN


                                                 . Pt on vent and on hold in ED


                                                 . Per MD [place], TPN consult


                                                 an error; pt likely extubated


                                                 tomorrow, however, can start


                                                 TF.


     Burn                                        Absent


     Trauma                                      Absent


     Current % PO                                Negligible


     Minimum of two criteria                     No physical signs of


                                                 malnutrition


     #1


      Nutrition Diagnosis                        Inadequate oral intake


      Etiology                                   ARF


      As Evidenced by Signs and Symptoms         Pt on vent and unable to


                                                 consume PO


     Is patient on ventilator?                   Yes


     Is Patient Ambulatory and/or Out of Bed     No


     REE-([facility]-confined to bed)      [id number]


     Calculation Used for Recommendations        [facility]


     Additional Notes                            Protein: (1.2-2g/kg) 98-164g


                                                 Fluid: 1 ml/kcal


 Nutrition Intervention


     Change Diet Order:                          Start TF when able


     Nutrition Support:                          Osmolite 1.5 at 60ml/hr


                                                 Flush 175 ml q4h


     Kcal                                        2,160


     Protein (gm)                                90


     Carbohydrates (gm)                          293


     Fat (gm)                                    71


     Fluid (mL)                                  1,097


     Goal #1                                     Start TF or extubation


     Anticipated Discharge Needs:                Unable to determine at this


                                                 time


     Follow-Up By:                               06/17/21


     Additional Comments                         FU for TF start/tolerance or


                                                 extubation and BG, renal labs

## 2021-06-16 NOTE — PROGRESS NOTE
Assessment and Plan





 Assessment and Plan 


# Acute respiratory failure


-Today more interactive moving all limbs no witnessed seizure since admission


-On Keppra 1000 mg bid


-MRI is pending


-EEG -- mild diffuse slowing 


-whether with hypoxia or hypercapnia   


- related to seizure R/O infection


-Admit the patient to the ICU.  Patient is status post intubation.  DuoNeb by 

nebulizer every 4 hours.  Albuterol by nebulizer every 4 hours as needed.  

Patient -is seen and evaluated by pulmonary


-








# Status epilepticus


-Keppra 1000 mg IV x1 dose 


-then 1000 mg IV every 12 hours. 


- Patient also get fosphenytoin 6100 g IV x1 dose. 


- EEG diffuse slowing


- Ativan as needed. 


- We also order MRI of the brain w/o IV contrast. 


-Ct brain showed bifrontal hypodensity 





# History of traumatic brain injury


-Stable. 





# Hyperglycemia


-We will monitor the glucose closely.  





# DVT prophylaxis


-Heparin 5000 units subcu every 8 hours for DVT prophylaxis. 


- Pepcid 20 mg IV twice daily for GI prophylaxis. 


- Patient is a full code.











Subjective


Date of service: 06/16/21


Principal diagnosis: Seizure


Interval history: 





doing well still intubated and sedated on [facility] propofol


moving all limbs follow command


No witnessed seizure


EEG is remarkable for mild slowing 5-7 HZ . no epileptiform discharges is noted


MRI is pending





Objective





- Vital Sign


                               Vital Signs - 12hr











  06/15/21 06/15/21 06/15/21





  23:00 23:30 23:54


 


Temperature   


 


Pulse Rate 83 83 84


 


Pulse Rate [   





Anterior   





Bilateral   





Throughout]   


 


Respiratory 16 17 16





Rate   


 


Respiratory   





Rate [Anterior   





Bilateral   





Throughout]   


 


Blood Pressure 125/67 119/69 119/69


 


O2 Sat by Pulse 100 100 100





Oximetry   














  06/16/21 06/16/21 06/16/21





  00:00 00:30 00:48


 


Temperature 98.9 F  


 


Pulse Rate 90 111 H 110 H


 


Pulse Rate [   





Anterior   





Bilateral   





Throughout]   


 


Respiratory 16 19 





Rate   


 


Respiratory   





Rate [Anterior   





Bilateral   





Throughout]   


 


Blood Pressure 138/83 138/83 138/73


 


O2 Sat by Pulse 100 100 100





Oximetry   














  06/16/21 06/16/21 06/16/21





  01:00 01:30 01:58


 


Temperature   


 


Pulse Rate 92 H 87 


 


Pulse Rate [   94 H





Anterior   





Bilateral   





Throughout]   


 


Respiratory 16 18 





Rate   


 


Respiratory   20





Rate [Anterior   





Bilateral   





Throughout]   


 


Blood Pressure 140/79 138/73 


 


O2 Sat by Pulse 100 95 





Oximetry   














  06/16/21 06/16/21 06/16/21





  02:00 02:30 03:00


 


Temperature   


 


Pulse Rate 87 86 85


 


Pulse Rate [   





Anterior   





Bilateral   





Throughout]   


 


Respiratory 18 17 16





Rate   


 


Respiratory   





Rate [Anterior   





Bilateral   





Throughout]   


 


Blood Pressure 113/64 137/80 124/71


 


O2 Sat by Pulse 97 100 99





Oximetry   














  06/16/21 06/16/21 06/16/21





  03:23 03:30 04:00


 


Temperature 99.1 F  


 


Pulse Rate  88 84


 


Pulse Rate [   





Anterior   





Bilateral   





Throughout]   


 


Respiratory  16 16





Rate   


 


Respiratory   





Rate [Anterior   





Bilateral   





Throughout]   


 


Blood Pressure  127/72 125/78


 


O2 Sat by Pulse  100 100





Oximetry   














  06/16/21 06/16/21 06/16/21





  04:30 05:00 05:30


 


Temperature   


 


Pulse Rate 88 89 108 H


 


Pulse Rate [   





Anterior   





Bilateral   





Throughout]   


 


Respiratory 16 16 13





Rate   


 


Respiratory   





Rate [Anterior   





Bilateral   





Throughout]   


 


Blood Pressure 125/73 123/68 136/94


 


O2 Sat by Pulse 100 100 100





Oximetry   














  06/16/21 06/16/21 06/16/21





  06:00 06:30 07:00


 


Temperature   


 


Pulse Rate 108 H 87 85


 


Pulse Rate [   





Anterior   





Bilateral   





Throughout]   


 


Respiratory 17 14 15





Rate   


 


Respiratory   





Rate [Anterior   





Bilateral   





Throughout]   


 


Blood Pressure 136/94 124/66 125/62


 


O2 Sat by Pulse 100 100 100





Oximetry   














  06/16/21 06/16/21 06/16/21





  07:30 08:00 08:30


 


Temperature   


 


Pulse Rate 104 H 93 H 93 H


 


Pulse Rate [   





Anterior   





Bilateral   





Throughout]   


 


Respiratory 17 17 18





Rate   


 


Respiratory   





Rate [Anterior   





Bilateral   





Throughout]   


 


Blood Pressure 124/66 122/65 121/71


 


O2 Sat by Pulse 99 95 97





Oximetry   














  06/16/21





  09:00


 


Temperature 


 


Pulse Rate 93 H


 


Pulse Rate [ 





Anterior 





Bilateral 





Throughout] 


 


Respiratory 16





Rate 


 


Respiratory 





Rate [Anterior 





Bilateral 





Throughout] 


 


Blood Pressure 111/51


 


O2 Sat by Pulse 98





Oximetry 














- General Apperance


Constitutional: comfortable





- EENT


EENT: PERRL, mucous membranes moist





- Respiratory


Respiratory: chest non-tender, lungs clear, rhonchi





- Cardiovascular


Cardiovascular: regular rate, normal S1, normal S2


Extremities: no peripheral edema bilat, no clubbing, cyanosis





- Gastrointestinal


Gastrointestinal: normoactive bowel sounds





- Integumentary


Integumentary: normal





- Neurologic


Cranial nerve examination: PERRL, EOMI, VFF, anisocoria, intact


Detailed motor examination: grossly full strength in





- Laboratory Findings


CBC and BMP: 


                                 06/16/21 06:32





                                 06/16/21 06:32


Abnormal Lab Findings: 


                                  Abnormal Labs











  06/14/21 06/14/21 06/15/21





  23:44 23:44 10:41


 


WBC  18.3 H   12.2 H


 


Hgb  11.3 L  


 


Hct   


 


MCV    80 L


 


MCH  26 L   25 L


 


MCHC  29 L  


 


RDW  20.7 H   20.3 H


 


Lymph % (Auto)   


 


Mono % (Auto)   


 


Lymph # (Auto)   


 


Mono # (Auto)   


 


Seg Neutrophils %   


 


Lymphocytes % (Manual)  36.0 H  


 


Monocytes % (Manual)  8.0 H  


 


Seg Neutrophils #   


 


Seg Neutrophils # Man  9.5 H  


 


Lymphocytes # (Manual)  6.6 H  


 


Monocytes # (Manual)  1.5 H  


 


Eosinophils # (Manual)  0.5 H  


 


Basophils # (Manual)  0.2 H  


 


ABG pO2   


 


ABG Base Excess   


 


ABG Hemoglobin   


 


Chloride   95.2 L 


 


Carbon Dioxide   10 L 


 


BUN   8 L 


 


Creatinine   1.9 H 


 


Glucose   255 H 


 


POC Glucose   


 


AST   58 H 


 


Total Creatine Kinase   1147 H 














  06/15/21 06/15/21 06/15/21





  10:41 23:36 Unknown


 


WBC   


 


Hgb   


 


Hct   


 


MCV   


 


MCH   


 


MCHC   


 


RDW   


 


Lymph % (Auto)   


 


Mono % (Auto)   


 


Lymph # (Auto)   


 


Mono # (Auto)   


 


Seg Neutrophils %   


 


Lymphocytes % (Manual)   


 


Monocytes % (Manual)   


 


Seg Neutrophils #   


 


Seg Neutrophils # Man   


 


Lymphocytes # (Manual)   


 


Monocytes # (Manual)   


 


Eosinophils # (Manual)   


 


Basophils # (Manual)   


 


ABG pO2    125.7 H


 


ABG Base Excess    -3.2 L


 


ABG Hemoglobin    10.6 L


 


Chloride   


 


Carbon Dioxide   


 


BUN  8 L  


 


Creatinine   


 


Glucose  110 H  


 


POC Glucose   107 H 


 


AST   


 


Total Creatine Kinase   














  06/16/21 06/16/21 06/16/21





  05:18 06:32 06:32


 


WBC   


 


Hgb   11.1 L 


 


Hct   34.6 L 


 


MCV   81 L 


 


MCH   26 L 


 


MCHC   


 


RDW   20.3 H 


 


Lymph % (Auto)   8.9 L 


 


Mono % (Auto)   11.1 H 


 


Lymph # (Auto)   0.9 L 


 


Mono # (Auto)   1.1 H 


 


Seg Neutrophils %   78.6 H 


 


Lymphocytes % (Manual)   


 


Monocytes % (Manual)   


 


Seg Neutrophils #   7.9 H 


 


Seg Neutrophils # Man   


 


Lymphocytes # (Manual)   


 


Monocytes # (Manual)   


 


Eosinophils # (Manual)   


 


Basophils # (Manual)   


 


ABG pO2   


 


ABG Base Excess   


 


ABG Hemoglobin   


 


Chloride   


 


Carbon Dioxide   


 


BUN    8 L


 


Creatinine   


 


Glucose    106 H


 


POC Glucose  120 H  


 


AST   


 


Total Creatine Kinase

## 2021-06-16 NOTE — XRAY REPORT
CHEST 1 VIEW, 6/16/2021 1:31 AM 



CLINICAL INFORMATION/INDICATION: Respiratory failure



COMPARISON: Chest radiograph, 6/15/2021 at 7:13 AM



FINDINGS:



SUPPORT DEVICES: Endotracheal tube and esophagogastric tube remain in stable position.



HEART: The cardiac silhouette is normal in size.



LUNGS/PLEURA: Lungs are clear of focal airspace disease or significant pleural effusion.



ADDITIONAL FINDINGS: No additional acute findings.



IMPRESSION:

1. Stable appearance of the chest.



Signer Name: Jacklyn Ansari MD 

Signed: 6/16/2021 6:00 AM

Workstation Name: [id number]

## 2021-06-16 NOTE — PROGRESS NOTE
Assessment and Plan





19 y/o with prior TBI and seizures admitted with status epilepticus and acute 

respiratory failure.





6/16/21:  Extubate today.  Once extubated, transfer to floor.  Follow up 

neurology recs, doubt he will be still enough for MRI.  Will sign off once out 

of unit.





1.  Needs EEG on and off sedation if possible to make sure seizures have been 

aborted


2.  Neurology consult


3.  Need to speak with mother to get more history regarding meds, exposures, 

compliance etc


4.  RHONDA, likely volume related, or pending how long he was seizing.  Agree with 

volume and trend CK levels


5.  Guarded to poor prognosis.  Per nursing patient was awake but not sure 

exactly what type of wakeness they were dealing with.  Per documentation, 

patient could be have been seizing for an extended period of time.





CCT 31 minutes.





Subjective


Date of service: 06/16/21


Principal diagnosis: Seizure


Interval history: 





Extubate today.  Awake on diprovan.  No seizures.  





Objective


                               Vital Signs - 12hr











  06/15/21 06/15/21 06/16/21





  23:30 23:54 00:00


 


Temperature   98.9 F


 


Pulse Rate 83 84 90


 


Pulse Rate [   





Anterior   





Bilateral   





Throughout]   


 


Respiratory 17 16 16





Rate   


 


Respiratory   





Rate [Anterior   





Bilateral   





Throughout]   


 


Blood Pressure 119/69 119/69 138/83


 


O2 Sat by Pulse 100 100 100





Oximetry   














  06/16/21 06/16/21 06/16/21





  00:30 00:48 01:00


 


Temperature   


 


Pulse Rate 111 H 110 H 92 H


 


Pulse Rate [   





Anterior   





Bilateral   





Throughout]   


 


Respiratory 19  16





Rate   


 


Respiratory   





Rate [Anterior   





Bilateral   





Throughout]   


 


Blood Pressure 138/83 138/73 140/79


 


O2 Sat by Pulse 100 100 100





Oximetry   














  06/16/21 06/16/21 06/16/21





  01:30 01:58 02:00


 


Temperature   


 


Pulse Rate 87  87


 


Pulse Rate [  94 H 





Anterior   





Bilateral   





Throughout]   


 


Respiratory 18  18





Rate   


 


Respiratory  20 





Rate [Anterior   





Bilateral   





Throughout]   


 


Blood Pressure 138/73  113/64


 


O2 Sat by Pulse 95  97





Oximetry   














  06/16/21 06/16/21 06/16/21





  02:30 03:00 03:23


 


Temperature   99.1 F


 


Pulse Rate 86 85 


 


Pulse Rate [   





Anterior   





Bilateral   





Throughout]   


 


Respiratory 17 16 





Rate   


 


Respiratory   





Rate [Anterior   





Bilateral   





Throughout]   


 


Blood Pressure 137/80 124/71 


 


O2 Sat by Pulse 100 99 





Oximetry   














  06/16/21 06/16/21 06/16/21





  03:30 04:00 04:30


 


Temperature   


 


Pulse Rate 88 84 88


 


Pulse Rate [   





Anterior   





Bilateral   





Throughout]   


 


Respiratory 16 16 16





Rate   


 


Respiratory   





Rate [Anterior   





Bilateral   





Throughout]   


 


Blood Pressure 127/72 125/78 125/73


 


O2 Sat by Pulse 100 100 100





Oximetry   














  06/16/21 06/16/21 06/16/21





  05:00 05:30 06:00


 


Temperature   


 


Pulse Rate 89 108 H 108 H


 


Pulse Rate [   





Anterior   





Bilateral   





Throughout]   


 


Respiratory 16 13 17





Rate   


 


Respiratory   





Rate [Anterior   





Bilateral   





Throughout]   


 


Blood Pressure 123/68 136/94 136/94


 


O2 Sat by Pulse 100 100 100





Oximetry   














  06/16/21 06/16/21 06/16/21





  06:30 07:00 07:30


 


Temperature   


 


Pulse Rate 87 85 104 H


 


Pulse Rate [   





Anterior   





Bilateral   





Throughout]   


 


Respiratory 14 15 17





Rate   


 


Respiratory   





Rate [Anterior   





Bilateral   





Throughout]   


 


Blood Pressure 124/66 125/62 124/66


 


O2 Sat by Pulse 100 100 99





Oximetry   














  06/16/21 06/16/21 06/16/21





  08:00 08:30 09:00


 


Temperature   


 


Pulse Rate 93 H 93 H 93 H


 


Pulse Rate [   





Anterior   





Bilateral   





Throughout]   


 


Respiratory 17 18 16





Rate   


 


Respiratory   





Rate [Anterior   





Bilateral   





Throughout]   


 


Blood Pressure 122/65 121/71 111/51


 


O2 Sat by Pulse 95 97 98





Oximetry   











Constitutional: other (sedated)


ENT: other (orally intubated)


Neck: supple


Effort: normal


Ascultation: Bilateral: clear


Gastrointestinal: normoactive bowel sounds


Integumentary: normal


CBC and BMP: 


                                 06/16/21 06:32





                                 06/16/21 06:32


ABG, PT/INR, D-dimer: 


ABG











ABG pH  7.406 pH Units (7.350-7.450)   06/15/21  Unknown


 


ABG pCO2  34.0 mm Hg  06/15/21  Unknown


 


ABG pO2  125.7 mm Hg (80.0-90.0)  H  06/15/21  Unknown


 


ABG O2 Saturation  98.5 % (95.0-99.0)   06/15/21  Unknown








Abnormal lab findings: 


                                  Abnormal Labs











  06/14/21 06/14/21 06/15/21





  23:44 23:44 10:41


 


WBC  18.3 H   12.2 H


 


Hgb  11.3 L  


 


Hct   


 


MCV    80 L


 


MCH  26 L   25 L


 


MCHC  29 L  


 


RDW  20.7 H   20.3 H


 


Lymph % (Auto)   


 


Mono % (Auto)   


 


Lymph # (Auto)   


 


Mono # (Auto)   


 


Seg Neutrophils %   


 


Lymphocytes % (Manual)  36.0 H  


 


Monocytes % (Manual)  8.0 H  


 


Seg Neutrophils #   


 


Seg Neutrophils # Man  9.5 H  


 


Lymphocytes # (Manual)  6.6 H  


 


Monocytes # (Manual)  1.5 H  


 


Eosinophils # (Manual)  0.5 H  


 


Basophils # (Manual)  0.2 H  


 


ABG pO2   


 


ABG Base Excess   


 


ABG Hemoglobin   


 


Chloride   95.2 L 


 


Carbon Dioxide   10 L 


 


BUN   8 L 


 


Creatinine   1.9 H 


 


Glucose   255 H 


 


POC Glucose   


 


AST   58 H 


 


Total Creatine Kinase   1147 H 














  06/15/21 06/15/21 06/15/21





  10:41 23:36 Unknown


 


WBC   


 


Hgb   


 


Hct   


 


MCV   


 


MCH   


 


MCHC   


 


RDW   


 


Lymph % (Auto)   


 


Mono % (Auto)   


 


Lymph # (Auto)   


 


Mono # (Auto)   


 


Seg Neutrophils %   


 


Lymphocytes % (Manual)   


 


Monocytes % (Manual)   


 


Seg Neutrophils #   


 


Seg Neutrophils # Man   


 


Lymphocytes # (Manual)   


 


Monocytes # (Manual)   


 


Eosinophils # (Manual)   


 


Basophils # (Manual)   


 


ABG pO2    125.7 H


 


ABG Base Excess    -3.2 L


 


ABG Hemoglobin    10.6 L


 


Chloride   


 


Carbon Dioxide   


 


BUN  8 L  


 


Creatinine   


 


Glucose  110 H  


 


POC Glucose   107 H 


 


AST   


 


Total Creatine Kinase   














  06/16/21 06/16/21 06/16/21





  05:18 06:32 06:32


 


WBC   


 


Hgb   11.1 L 


 


Hct   34.6 L 


 


MCV   81 L 


 


MCH   26 L 


 


MCHC   


 


RDW   20.3 H 


 


Lymph % (Auto)   8.9 L 


 


Mono % (Auto)   11.1 H 


 


Lymph # (Auto)   0.9 L 


 


Mono # (Auto)   1.1 H 


 


Seg Neutrophils %   78.6 H 


 


Lymphocytes % (Manual)   


 


Monocytes % (Manual)   


 


Seg Neutrophils #   7.9 H 


 


Seg Neutrophils # Man   


 


Lymphocytes # (Manual)   


 


Monocytes # (Manual)   


 


Eosinophils # (Manual)   


 


Basophils # (Manual)   


 


ABG pO2   


 


ABG Base Excess   


 


ABG Hemoglobin   


 


Chloride   


 


Carbon Dioxide   


 


BUN    8 L


 


Creatinine   


 


Glucose    106 H


 


POC Glucose  120 H  


 


AST   


 


Total Creatine Kinase

## 2021-06-17 RX ADMIN — HEPARIN SODIUM SCH UNIT: 5000 INJECTION, SOLUTION INTRAVENOUS; SUBCUTANEOUS at 14:05

## 2021-06-17 RX ADMIN — FAMOTIDINE SCH MG: 20 TABLET ORAL at 09:31

## 2021-06-17 RX ADMIN — Medication SCH ML: at 09:32

## 2021-06-17 RX ADMIN — Medication SCH ML: at 23:28

## 2021-06-17 RX ADMIN — HALOPERIDOL LACTATE PRN MG: 5 INJECTION, SOLUTION INTRAMUSCULAR at 22:03

## 2021-06-17 RX ADMIN — INSULIN HUMAN SCH: 100 INJECTION, SOLUTION PARENTERAL at 17:01

## 2021-06-17 RX ADMIN — HALOPERIDOL LACTATE PRN MG: 5 INJECTION, SOLUTION INTRAMUSCULAR at 09:39

## 2021-06-17 RX ADMIN — HALOPERIDOL LACTATE PRN MG: 5 INJECTION, SOLUTION INTRAMUSCULAR at 15:30

## 2021-06-17 RX ADMIN — LEVETIRACETAM SCH MLS/HR: 100 INJECTION, SOLUTION INTRAVENOUS at 10:05

## 2021-06-17 RX ADMIN — HEPARIN SODIUM SCH UNIT: 5000 INJECTION, SOLUTION INTRAVENOUS; SUBCUTANEOUS at 22:04

## 2021-06-17 RX ADMIN — ESCITALOPRAM OXALATE SCH MG: 10 TABLET ORAL at 09:31

## 2021-06-17 RX ADMIN — LEVETIRACETAM SCH MLS/HR: 100 INJECTION, SOLUTION INTRAVENOUS at 22:02

## 2021-06-17 RX ADMIN — INSULIN HUMAN SCH: 100 INJECTION, SOLUTION PARENTERAL at 06:08

## 2021-06-17 RX ADMIN — INSULIN HUMAN SCH: 100 INJECTION, SOLUTION PARENTERAL at 00:28

## 2021-06-17 RX ADMIN — FAMOTIDINE SCH MG: 20 TABLET ORAL at 22:03

## 2021-06-17 RX ADMIN — HEPARIN SODIUM SCH UNIT: 5000 INJECTION, SOLUTION INTRAVENOUS; SUBCUTANEOUS at 06:10

## 2021-06-17 RX ADMIN — INSULIN HUMAN SCH: 100 INJECTION, SOLUTION PARENTERAL at 11:50

## 2021-06-17 NOTE — PROGRESS NOTE
Assessment and Plan


Assessment and plan: 





This is a 20-year-old male with seizures and TBI was monitored for status 

epilepticus, acute respiratory failure, hyperglycemia, acute kidney injury and 

rhabdomyolysis





Status epilepticus


Acute hypoxic respiratory failure, extubated 6/16


Acute kidney injury, improving/resolved


Rhabdomyolysis


Leukocytosis (improved)


Hypochloremia (resolved)


Metabolic acidosis (resolved)


UDS positive for marijuana


COVID 19 PUI, ruled out


Hyperglycemia


Hx of TBI


Hx of Seizures





-CCM, neurology consulted, appreciate recommendations


-Keppra 1000 mg x 1, fosphenytoin 1600 mgx1 in the emergency department


-Keppra 1000 mg twice daily


-Pulmonary hygiene


-Haldol as needed


-Seizure and aspiration precautions


-Continue home lexapro


-Hydralazine PRN


-SSI, Accu-Cheks every 6


-6/14 CT head shows confluent regions of hypodensity and volume loss within the 

bilateral frontal lobes, findings may be secondary to previous trauma or 

ischemia, postsurgical changes from right convexity craniectomy, no evidence of 

mass-effect or midline shift, or acute intracranial hemorrhage


-6/15 MRI Brain pending


-6/16 EEG shows mildly abnormal be due to low voltage which is suggestive of 

encephalopathic process and/or postictal state and/or drug effect.  No 

epileptiform discharges


-6/15 CXR shows some minor left basilar atelectasis





DVT/GI prophylaxis: Heparin subcu, SCDs to bilateral lower extremities, PPI


Disposition: Continue treatment for acute seizures, MRI of brain pending





History


Interval history: 





6/17/2021: Patient seen and examined, more arousable this morning, no agitation.

 Sleeping in bed, denies any pain or headaches.  MRI pending.





Hospitalist Physical





- Physical exam


Narrative exam: 








General appearance: no acute distress, well-nourished


Head: Surgical scar on the right cranium, no signs of trauma


EENT: PERRL, EOM intact, hearing intact, clear oral mucosa


Respiratory: bilateral CTA, negative: rales, rhonchi, wheezing


Cardiovascular: Regular rate/rhythm, Normal S1 & S2. No gallop, rub


Extremities: no ischemia, No edema, normal temperature, normal color, Full ROM


Abdominal: soft, no tenderness, non-distended, normal bowel sounds


Integumentary: Present: clear, warm, dry no wounds, no erythema noted


Neurologic: CNII-XII intact, moves all extremities, no sensory or motor 

abnormalities











- Constitutional


Vitals: 


                                        











Temp Pulse Resp BP Pulse Ox


 


 97.3 F L  81   18   108/62   99 


 


 06/17/21 08:04  06/17/21 08:04  06/17/21 08:04  06/17/21 08:04  06/17/21 08:04











General appearance: Present: other (Traumatic cephalic)





Results





- Labs


CBC & Chem 7: 


                                 06/16/21 06:32





                                 06/16/21 06:32


Labs: 


                             Laboratory Last Values











WBC  10.0 K/mm3 (4.5-11.0)   06/16/21  06:32    


 


RBC  4.28 M/mm3 (3.65-5.03)   06/16/21  06:32    


 


Hgb  11.1 gm/dl (11.8-15.2)  L  06/16/21  06:32    


 


Hct  34.6 % (35.5-45.6)  L  06/16/21  06:32    


 


MCV  81 fl (84-94)  L  06/16/21  06:32    


 


MCH  26 pg (28-32)  L  06/16/21  06:32    


 


MCHC  32 % (32-34)   06/16/21  06:32    


 


RDW  20.3 % (13.2-15.2)  H  06/16/21  06:32    


 


Plt Count  179 K/mm3 (140-440)   06/16/21  06:32    


 


Lymph % (Auto)  8.9 % (13.4-35.0)  L  06/16/21  06:32    


 


Mono % (Auto)  11.1 % (0.0-7.3)  H  06/16/21  06:32    


 


Eos % (Auto)  1.1 % (0.0-4.3)   06/16/21  06:32    


 


Baso % (Auto)  0.3 % (0.0-1.8)   06/16/21  06:32    


 


Lymph # (Auto)  0.9 K/mm3 (1.2-5.4)  L  06/16/21  06:32    


 


Mono # (Auto)  1.1 K/mm3 (0.0-0.8)  H  06/16/21  06:32    


 


Eos # (Auto)  0.1 K/mm3 (0.0-0.4)   06/16/21  06:32    


 


Baso # (Auto)  0.0 K/mm3 (0.0-0.1)   06/16/21  06:32    


 


Add Manual Diff  Complete   06/14/21  23:44    


 


Total Counted  100   06/14/21  23:44    


 


Seg Neutrophils %  78.6 % (40.0-70.0)  H  06/16/21  06:32    


 


Seg Neuts % (Manual)  52.0 % (40.0-70.0)   06/14/21  23:44    


 


Lymphocytes % (Manual)  36.0 % (13.4-35.0)  H  06/14/21  23:44    


 


Monocytes % (Manual)  8.0 % (0.0-7.3)  H  06/14/21  23:44    


 


Eosinophils % (Manual)  3.0 % (0.0-4.3)   06/14/21  23:44    


 


Basophils % (Manual)  1.0 % (0.0-1.8)   06/14/21  23:44    


 


Nucleated RBC %  Not Reportable   06/14/21  23:44    


 


Seg Neutrophils #  7.9 K/mm3 (1.8-7.7)  H  06/16/21  06:32    


 


Seg Neutrophils # Man  9.5 K/mm3 (1.8-7.7)  H  06/14/21  23:44    


 


Band Neutrophils #  0.0 K/mm3  06/14/21  23:44    


 


Lymphocytes # (Manual)  6.6 K/mm3 (1.2-5.4)  H  06/14/21  23:44    


 


Abs React Lymphs (Man)  0.0 K/mm3  06/14/21  23:44    


 


Monocytes # (Manual)  1.5 K/mm3 (0.0-0.8)  H  06/14/21  23:44    


 


Eosinophils # (Manual)  0.5 K/mm3 (0.0-0.4)  H  06/14/21  23:44    


 


Basophils # (Manual)  0.2 K/mm3 (0.0-0.1)  H  06/14/21  23:44    


 


Metamyelocytes #  0.0 K/mm3  06/14/21  23:44    


 


Myelocytes #  0.0 K/mm3  06/14/21  23:44    


 


Promyelocytes #  0.0 K/mm3  06/14/21  23:44    


 


Blast Cells #  0.0 K/mm3  06/14/21  23:44    


 


WBC Morphology  Not Reportable   06/14/21  23:44    


 


Hypersegmented Neuts  Not Reportable   06/14/21  23:44    


 


Hyposegmented Neuts  Not Reportable   06/14/21  23:44    


 


Hypogranular Neuts  Not Reportable   06/14/21  23:44    


 


Smudge Cells  Not Reportable   06/14/21  23:44    


 


Toxic Granulation  Not Reportable   06/14/21  23:44    


 


Toxic Vacuolation  Not Reportable   06/14/21  23:44    


 


Dohle Bodies  Not Reportable   06/14/21  23:44    


 


Pelger-Huet Anomaly  Not Reportable   06/14/21  23:44    


 


Verona Rods  Not Reportable   06/14/21  23:44    


 


Platelet Estimate  Not Reportable   06/14/21  23:44    


 


Clumped Platelets  Not Reportable   06/14/21  23:44    


 


Plt Clumps, EDTA  Not Reportable   06/14/21  23:44    


 


Large Platelets  Not Reportable   06/14/21  23:44    


 


Giant Platelets  Not Reportable   06/14/21  23:44    


 


Platelet Satelliting  Not Reportable   06/14/21  23:44    


 


Plt Morphology Comment  Not Reportable   06/14/21  23:44    


 


RBC Morphology  Not Reportable   06/14/21  23:44    


 


Dimorphic RBCs  Not Reportable   06/14/21  23:44    


 


Polychromasia  Not Reportable   06/14/21  23:44    


 


Hypochromasia  1+   06/14/21  23:44    


 


Poikilocytosis  Not Reportable   06/14/21  23:44    


 


Anisocytosis  1+   06/14/21  23:44    


 


Microcytosis  Few   06/14/21  23:44    


 


Macrocytosis  Not Reportable   06/14/21  23:44    


 


Spherocytes  Not Reportable   06/14/21  23:44    


 


Pappenheimer Bodies  Not Reportable   06/14/21  23:44    


 


Sickle Cells  Not Reportable   06/14/21  23:44    


 


Target Cells  Not Reportable   06/14/21  23:44    


 


Tear Drop Cells  Not Reportable   06/14/21  23:44    


 


Ovalocytes  Not Reportable   06/14/21  23:44    


 


Helmet Cells  Not Reportable   06/14/21  23:44    


 


[place] Bodies  Not Reportable   06/14/21  23:44    


 


Cabot Rings  Not Reportable   06/14/21  23:44    


 


Lantry Cells  Not Reportable   06/14/21  23:44    


 


Bite Cells  Not Reportable   06/14/21  23:44    


 


Crenated Cell  Not Reportable   06/14/21  23:44    


 


Elliptocytes  Not Reportable   06/14/21  23:44    


 


Acanthocytes (Spur)  Not Reportable   06/14/21  23:44    


 


Rouleaux  Not Reportable   06/14/21  23:44    


 


Hemoglobin C Crystals  Not Reportable   06/14/21  23:44    


 


Schistocytes  Not Reportable   06/14/21  23:44    


 


Malaria parasites  Not Reportable   06/14/21  23:44    


 


Mann Bodies  Not Reportable   06/14/21  23:44    


 


Hem Pathologist Commnt  No   06/14/21  23:44    


 


ABG pH  7.406 pH Units (7.350-7.450)   06/15/21  Unknown


 


ABG pCO2  34.0 mm Hg  06/15/21  Unknown


 


ABG pO2  125.7 mm Hg (80.0-90.0)  H  06/15/21  Unknown


 


ABG HCO3  20.9 mmol/L (20.0-26.0)   06/15/21  Unknown


 


ABG O2 Saturation  98.5 % (95.0-99.0)   06/15/21  Unknown


 


ABG O2 Content  14.5  (0.0-44)   06/15/21  Unknown


 


ABG Base Excess  -3.2 mmol/L (-2.0-3.0)  L  06/15/21  Unknown


 


ABG Hemoglobin  10.6 gm/dl (14.0-18.0)  L  06/15/21  Unknown


 


ABG Carboxyhemoglobin  1.7 % (0.0-5.0)   06/15/21  Unknown


 


ABG Methemoglobin  0.5 % (0.0-1.5)   06/15/21  Unknown


 


Oxyhemoglobin  96.3 % (95.0-99.0)   06/15/21  Unknown


 


FiO2  21 %  06/15/21  Unknown


 


Sodium  142 mmol/L (137-145)   06/16/21  06:32    


 


Potassium  4.0 mmol/L (3.6-5.0)   06/16/21  06:32    


 


Chloride  105.9 mmol/L ()   06/16/21  06:32    


 


Carbon Dioxide  25 mmol/L (22-30)   06/16/21  06:32    


 


Anion Gap  15 mmol/L  06/16/21  06:32    


 


BUN  8 mg/dL (9-20)  L  06/16/21  06:32    


 


Creatinine  1.0 mg/dL (0.8-1.3)   06/16/21  06:32    


 


Estimated GFR  > 60 ml/min  06/16/21  06:32    


 


BUN/Creatinine Ratio  8 %  06/16/21  06:32    


 


Glucose  106 mg/dL ()  H  06/16/21  06:32    


 


POC Glucose  83 mg/dL ()   06/17/21  05:55    


 


Calcium  9.1 mg/dL (8.4-10.2)   06/16/21  06:32    


 


Total Bilirubin  0.40 mg/dL (0.1-1.2)   06/14/21  23:44    


 


AST  58 units/L (5-40)  H  06/14/21  23:44    


 


ALT  23 units/L (7-56)   06/14/21  23:44    


 


Alkaline Phosphatase  78 units/L ()   06/14/21  23:44    


 


Total Creatine Kinase  1147 units/L ()  H  06/14/21  23:44    


 


Total Protein  7.8 g/dL (6.3-8.2)   06/14/21  23:44    


 


Albumin  4.8 g/dL (3.9-5)   06/14/21  23:44    


 


Albumin/Globulin Ratio  1.6 %  06/14/21  23:44    


 


TSH  3.550 mlU/mL (0.270-4.200)   06/14/21  23:44    


 


Urine Color  Straw  (Yellow)   06/15/21  Unknown


 


Urine Turbidity  Slightly-cloudy  (Clear)   06/15/21  Unknown


 


Urine pH  6.0  (5.0-7.0)   06/15/21  Unknown


 


Ur Specific Gravity  1.006  (1.003-1.030)   06/15/21  Unknown


 


Urine Protein  <15 mg/dl mg/dL (Negative)   06/15/21  Unknown


 


Urine Glucose (UA)  Neg mg/dL (Negative)   06/15/21  Unknown


 


Urine Ketones  Neg mg/dL (Negative)   06/15/21  Unknown


 


Urine Blood  Mod  (Negative)   06/15/21  Unknown


 


Urine Nitrite  Neg  (Negative)   06/15/21  Unknown


 


Urine Bilirubin  Neg  (Negative)   06/15/21  Unknown


 


Urine Urobilinogen  < 2.0 mg/dL (<2.0)   06/15/21  Unknown


 


Ur Leukocyte Esterase  Neg  (Negative)   06/15/21  Unknown


 


Urine WBC (Auto)  2.0 /HPF (0.0-6.0)   06/15/21  Unknown


 


Urine RBC (Auto)  4.0 /HPF (0.0-6.0)   06/15/21  Unknown


 


Urine Bacteria (Auto)  1+ /HPF (Negative)   06/15/21  Unknown


 


Triple Phos Crystals  Few   06/15/21  Unknown


 


Urine Mucus  Few /HPF  06/15/21  Unknown


 


Urine Opiates Screen  Negative   06/15/21  Unknown


 


Urine Methadone Screen  Negative   06/15/21  Unknown


 


Ur Barbiturates Screen  Negative   06/15/21  Unknown


 


Ur Phencyclidine Scrn  Negative   06/15/21  Unknown


 


Ur Amphetamines Screen  Negative   06/15/21  Unknown


 


U Benzodiazepines Scrn  Negative   06/15/21  Unknown


 


Urine Cocaine Screen  Negative   06/15/21  Unknown


 


U Marijuana (THC) Screen  Presumptive positive   06/15/21  Unknown


 


Drugs of Abuse Note  Disclamer   06/15/21  Unknown


 


Plasma/Serum Alcohol  < 0.01 % (0-0.07)   06/14/21  23:44    


 


Coronavirus (PCR)  Negative  (Negative)   06/15/21  Unknown











Tom/IV: 


                                        





Voiding Method                   Incontinent











Active Medications





- Current Medications


Current Medications: 














Generic Name Dose Route Start Last Admin





  Trade Name Freq  PRN Reason Stop Dose Admin


 


Acetaminophen  650 mg  06/15/21 05:37 





  Acetaminophen 325 Mg Tab  PO  





  Q4H PRN  





  Pain MILD(1-3)/Fever >100.5/HA  


 


Albuterol  2.5 mg  06/15/21 05:37 





  Albuterol 2.5 Mg/3 Ml Nebu  IH  





  Q4HRT PRN  





  Shortness Of Breath  


 


Dextrose  50 ml  06/15/21 08:09 





  Dextrose 50% In Water (25gm) 50 Ml Syringe  IV  





  Q30MIN PRN  





  Hypoglycemia  





  Protocol  


 


Escitalopram Oxalate  5 mg  06/17/21 10:00  06/17/21 09:31





  Escitalopram 10 Mg Tab  PO   5 mg





  DAILY JO ANN   Administration


 


Famotidine  20 mg  06/16/21 10:00  06/17/21 09:31





  Famotidine 20 Mg Tab  PO   20 mg





  BID JO ANN   Administration


 


Haloperidol Lactate  5 mg  06/16/21 11:19  06/17/21 09:39





  Haloperidol Lactate 5 Mg/1 Ml Inj  IV   5 mg





  Q6H PRN   Administration





  Agitation  


 


Heparin Sodium (Porcine)  5,000 unit  06/15/21 06:00  06/17/21 06:10





  Heparin 5,000 Unit/1 Ml Vial  SUB-Q   5,000 unit





  Q8HR JO ANN   Administration


 


Hydralazine HCl  10 mg  06/15/21 05:40 





  Hydralazine 20 Mg/1 Ml Inj  IV  





  Q6H PRN  





  htn  


 


Hydrophilic Ointment  1 applic  06/14/21 23:35 





  Lip Therapy Vaseline  TP  





  Q2HR PRN  





  Dry Lips  


 


Levetiracetam 1,000 mg/  110 mls @ 400 mls/hr  06/15/21 15:00  06/17/21 10:05





  Dextrose  IV   400 mls/hr





  Q12HR JO ANN   Administration


 


Insulin Human Regular  0 units  06/15/21 09:00  06/17/21 06:08





  Insulin Regular, Human 100 Units/1 Ml  SUB-Q   Not Given





  Q6HR [facility]  





  Protocol  


 


Lorazepam  2 mg  06/15/21 07:51  06/16/21 23:49





  Lorazepam 2 Mg/Ml Vial  IV   2 mg





  Q4H PRN   Administration





  AGITATION  


 


Multi-Ingred Cream/Lotion/Oil/Oint  1 applic  06/14/21 23:35 





  Mineral Oil/Petrolatum, White Ophth Oint 3.5 Gm  OU  





  Q4HR PRN  





  Dry Eye(s)  


 


Ondansetron HCl  4 mg  06/15/21 05:37 





  Ondansetron 4 Mg/2 Ml Inj  IV  





  Q8H PRN  





  Nausea And Vomiting  


 


Sodium Chloride  10 ml  06/15/21 10:00  06/17/21 09:32





  Sodium Chloride 0.9% 10 Ml Flush Syringe  IV   10 ml





  BID JO ANN   Administration


 


Sodium Chloride  10 ml  06/15/21 05:37 





  Sodium Chloride 0.9% 10 Ml Flush Syringe  IV  





  PRN PRN  





  LINE FLUSH  














Nutrition/Malnutrition Assess





- Dietary Evaluation


Nutrition/Malnutrition Findings: 


                                        





Nutrition Notes                                            Start:  06/15/21 

07:32


Freq:                                                      Status: Active       




Protocol:                                                                       




 Document     06/15/21 07:32  NATY  (Rec: 06/15/21 07:37  NATY  [id number])


 Nutrition Notes


     Need for Assessment generated from:         MD Order


     Initial or Follow up                        Assessment


     Current Diagnosis                           Respiratory Failure


     Other Pertinent Diagnosis                   seizure disorder, hx TBI


     Current Diet                                NPO


     Labs/Tests                                  BUN 8


                                                 Cr 1.9


                                                 


                                                 AST 58


     Pertinent Medications                       Propofol at 14.76 ml/hr


                                                 NS at 125 ml/hr


     Height                                      5 ft 10 in


     Weight                                      82 kg


     Ideal Body Weight (kg)                      75.45


     BMI                                         25.9


     Weight Status                               Appropriate


     Subjective/Other Information                MD order to evaluate


                                                 nutritional intake and for TPN


                                                 . Pt on vent and on hold in ED


                                                 . Per MD [place], TPN consult


                                                 an error; pt likely extubated


                                                 tomorrow, however, can start


                                                 TF.


     Burn                                        Absent


     Trauma                                      Absent


     Current % PO                                Negligible


     Minimum of two criteria                     No physical signs of


                                                 malnutrition


     #1


      Nutrition Diagnosis                        Inadequate oral intake


      Etiology                                   ARF


      As Evidenced by Signs and Symptoms         Pt on vent and unable to


                                                 consume PO


     Is patient on ventilator?                   Yes


     Is Patient Ambulatory and/or Out of Bed     No


     REE-([facility]-confined to bed)      [phone]


     Calculation Used for Recommendations        [facility]


     Additional Notes                            Protein: (1.2-2g/kg) 98-164g


                                                 Fluid: 1 ml/kcal


 Nutrition Intervention


     Change Diet Order:                          Start TF when able


     Nutrition Support:                          Osmolite 1.5 at 60ml/hr


                                                 Flush 175 ml q4h


     Kcal                                        2,160


     Protein (gm)                                90


     Carbohydrates (gm)                          293


     Fat (gm)                                    71


     Fluid (mL)                                  1,097


     Goal #1                                     Start TF or extubation


     Anticipated Discharge Needs:                Unable to determine at this


                                                 time


     Follow-Up By:                               06/17/21


     Additional Comments                         FU for TF start/tolerance or


                                                 extubation and BG, renal labs
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
## 2021-06-17 NOTE — MAGNETIC RESONANCE REPORT
MRI BRAIN WITHOUT CONTRAST



INDICATION / CLINICAL INFORMATION:

Altered mental status.



TECHNIQUE: 

Multiplanar, multisequence MR images of the brain were obtained.



COMPARISON: 

Head CT on 6/14/2021



FINDINGS:



BRAIN / INTRACRANIAL CONTENTS: No acute ischemia, acute hemorrhage, mass effect, midline shift, or hy
drocephalus. Small amount of chronic subdural hematoma remains over the right frontal convexity measu
ring about 5 mm in side-to-side dimension. There is stable chronic encephalomalacia in both anterior 
frontal lobes likely secondary to previous trauma. There is also stable chronic and cephalization the
 right anterior temporal lobe. Small areas of encephalomalacia are also seen in the left frontal oper
cular region.



CRANIOCERVICAL JUNCTION: No significant abnormality.

VASCULAR FLOW-VOIDS: No significant abnormality.



ORBITS: No significant abnormality of visualized orbits.

SINUSES / MASTOIDS: There is a small right mastoid effusion without aggressive or invasive features.



ADDITIONAL FINDINGS: There is an extra cranial scalp hematoma over the right frontoparietal scalp bridgette
suring 1.6 cm, superficial to previous craniotomy site.. 



IMPRESSION:

1. No definite acute intracranial abnormality.

2. 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
.

3. Extracranial scalp hematoma in the right frontoparietal scalp, similar to prior.

4. 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



Signer Name: Stefano Angel MD 

Signed: 6/17/2021 1:54 PM

Workstation Name: [id number]

## 2021-06-17 NOTE — XRAY REPORT
CHEST 1 VIEW 



INDICATION:  follow up respiratory failure.



COMPARISON:  Yesterday



FINDINGS:

Support devices: None. Endotracheal tube and nasogastric tube have been removed. 



Heart: Within normal limits. 

Lungs/Pleura: No acute air space or interstitial disease. 



Additional findings: None.



IMPRESSION:

 No acute findings.



Signer Name: Román Woodard Jr, MD 

Signed: 6/17/2021 8:44 AM

Workstation Name: [id number]

## 2021-06-18 VITALS — DIASTOLIC BLOOD PRESSURE: 64 MMHG | SYSTOLIC BLOOD PRESSURE: 137 MMHG

## 2021-06-18 RX ADMIN — Medication SCH ML: at 09:22

## 2021-06-18 RX ADMIN — FAMOTIDINE SCH MG: 20 TABLET ORAL at 09:21

## 2021-06-18 RX ADMIN — INSULIN HUMAN SCH: 100 INJECTION, SOLUTION PARENTERAL at 07:10

## 2021-06-18 RX ADMIN — LEVETIRACETAM SCH MLS/HR: 100 INJECTION, SOLUTION INTRAVENOUS at 09:22

## 2021-06-18 RX ADMIN — HEPARIN SODIUM SCH UNIT: 5000 INJECTION, SOLUTION INTRAVENOUS; SUBCUTANEOUS at 05:35

## 2021-06-18 RX ADMIN — ESCITALOPRAM OXALATE SCH MG: 10 TABLET ORAL at 09:21

## 2021-06-18 RX ADMIN — INSULIN HUMAN SCH: 100 INJECTION, SOLUTION PARENTERAL at 01:10
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
## 2021-06-18 NOTE — ELECTROCARDIOGRAPH REPORT
[facility]

                                       

Test Date:    2021-06-15               Test Time:    04:36:48

Pat Name:     KAELA HARRIS          Department:   

Patient ID:   [id number]          Room:         A456

Gender:       M                        Technician:   

:          [id number]               Requested By: RICARDO BAHENA

Order Number: [id number]              Reading MD:   Fawad Collier

                                 Measurements

Intervals                              Axis          

Rate:         82                       P:            -37

WY:           112                      QRS:          87

QRSD:         89                       T:            69

QT:           338                                    

QTc:          395                                    

                           Interpretive Statements

Sinus rhythm

ST elev, probable normal early repol pattern

No previous ECG available for comparison

Electronically Signed On 2021 18:54:22 EDT by Fawad Collier
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
## 2021-06-18 NOTE — DISCHARGE SUMMARY
Providers





- Providers


Date of Admission: 


06/15/21 04:09





Date of discharge: 06/18/21


Attending physician: 


MICHEAL PEACE MD





                                        





06/14/21 23:35


Consult to Dietitian/Nutrition [CONS] Routine 


   Physician Instructions: 


   Reason For Exam: 


   Reason for Consult: Evaluate nutritional intake





06/15/21 07:21


Consult to Physician [CONS] Routine 


   Comment: 


   Consulting Provider: NATALIIA MOORE


   Physician Instructions: 


   Reason For Exam: Status Epilepticus





06/15/21 07:50


Consult to Dietitian/Nutrition [CONS] Routine 


   Physician Instructions: 


   Reason For Exam: 


   Reason for Consult: Write/Manage TPN/PPN











Primary care physician: 


PRIMARY CARE MD








Hospitalization


Reason for admission: Seizure disorder


Condition: Good


Hospital course: 





This is a 20-year-old male with seizures and TBI was monitored for status 

epilepticus, acute respiratory failure, hyperglycemia, acute kidney injury and 

rhabdomyolysis





-[facility], neurology consulted, appreciate recommendations


-Keppra 1000 mg x 1, fosphenytoin 1600 mgx1 in the emergency department


-Keppra 1000 mg twice daily


-Pulmonary hygiene


-Haldol as needed


-Seizure and aspiration precautions


-Continue home lexapro


-Hydralazine PRN


-SSI, Accu-Cheks every 6


-6/14 CT head shows confluent regions of hypodensity and volume loss within the 

bilateral frontal lobes, findings may be secondary to previous trauma or 

ischemia, postsurgical changes from right convexity craniectomy, no evidence of 

mass-effect or midline shift, or acute intracranial hemorrhage


-6/15 MRI Brain with no definite intracranial abnormality with a tiny chronic 

subdural hematoma over the right cerebral convexity similar to prior exam.  

Extracranial scalp hematoma in the front frontal parietal scalp similar to prior

 exam.  Stable multifocal frontotemporal encephalomalacia secondary to previous 

trauma.


-6/16 EEG shows mildly abnormal be due to low voltage which is suggestive of 

encephalopathic process and/or postictal state and/or drug effect.  No 

epileptiform discharges


-6/15 CXR shows some minor left basilar atelectasis





History





6/17/2021: Patient seen and examined, more arousable this morning, no agitation.

  Sleeping in bed, denies any pain or headaches.  MRI pending.


6/18/2021: Patient seen and examined, patient states that he is feeling 

improved, wants to go home.  Spoke with mother, will give prescription for 

Keppra.  Advised she needs to obtain referral to neurologist for continued care.

  Agreed to plan.  Stable for discharge.


Disposition: DC-01 TO HOME OR SELFCARE


Final Discharge Diagnosis (Prints w/discharge instructions): Status epilepticus.

  Acute hypoxic respiratory failure, extubated 6/16.  Acute kidney injury, 

improving/resolved.  Rhabdomyolysis.  Leukocytosis (improved).  Hypochloremia 

(resolved).  Metabolic acidosis (resolved).  UDS positive for marijuana.  COVID 

19 PUI, ruled out.  Hyperglycemia.  Hx of TBI.  Hx of Seizures


Time spent for discharge: 35 minutes





Core Measure Documentation





- Palliative Care


Palliative Care/ Comfort Measures: Not Applicable





- Core Measures


Any of the following diagnoses?: none





Exam





- Physical Exam


Narrative exam: 








General appearance: no acute distress, well-nourished


Head: Surgical scar on the right cranium, no signs of trauma


EENT: PERRL, EOM intact, hearing intact, clear oral mucosa


Respiratory: bilateral CTA, negative: rales, rhonchi, wheezing


Cardiovascular: Regular rate/rhythm, Normal S1 & S2. No gallop, rub


Extremities: no ischemia, No edema, normal temperature, normal color, Full ROM


Abdominal: soft, no tenderness, non-distended, normal bowel sounds


Integumentary: Present: clear, warm, dry no wounds, no erythema noted


Neurologic: CNII-XII intact, moves all extremities, no sensory or motor 

abnormalities











- Constitutional


Vitals: 


                                        











Temp Pulse Resp BP Pulse Ox


 


 98.2 F   66   18   144/71   98 


 


 06/18/21 08:52  06/18/21 08:52  06/18/21 08:52  06/18/21 08:52  06/18/21 09:37














Plan


Activity: no restrictions


Diet: regular


Follow up with: 


PRIMARY CARE,MD [Primary Care Provider] - 3-5 Days


Prescriptions: 


levETIRAcetam [Keppra TAB] 1,000 mg PO BID 90 Days #180 tab

## 2021-11-04 ENCOUNTER — HOSPITAL ENCOUNTER (EMERGENCY)
Dept: HOSPITAL 5 - ED | Age: 21
Discharge: HOME | End: 2021-11-04
Payer: SELF-PAY

## 2021-11-04 VITALS — SYSTOLIC BLOOD PRESSURE: 114 MMHG | DIASTOLIC BLOOD PRESSURE: 70 MMHG

## 2021-11-04 DIAGNOSIS — Z79.899: ICD-10-CM

## 2021-11-04 DIAGNOSIS — R41.82: ICD-10-CM

## 2021-11-04 DIAGNOSIS — R56.9: Primary | ICD-10-CM

## 2021-11-04 LAB
BASOPHILS # (AUTO): 0.1 K/MM3 (ref 0–0.1)
BASOPHILS NFR BLD AUTO: 0.7 % (ref 0–1.8)
BUN SERPL-MCNC: 7 MG/DL (ref 9–20)
BUN/CREAT SERPL: 6 %
CALCIUM SERPL-MCNC: 9.4 MG/DL (ref 8.4–10.2)
EOSINOPHIL # BLD AUTO: 0.2 K/MM3 (ref 0–0.4)
EOSINOPHIL NFR BLD AUTO: 2.5 % (ref 0–4.3)
HCT VFR BLD CALC: 40 % (ref 35.5–45.6)
HEMOLYSIS INDEX: 4
HGB BLD-MCNC: 12.6 GM/DL (ref 11.8–15.2)
LYMPHOCYTES # BLD AUTO: 1.3 K/MM3 (ref 1.2–5.4)
LYMPHOCYTES NFR BLD AUTO: 15.9 % (ref 13.4–35)
MCHC RBC AUTO-ENTMCNC: 32 % (ref 32–34)
MCV RBC AUTO: 82 FL (ref 84–94)
MONOCYTES # (AUTO): 0.5 K/MM3 (ref 0–0.8)
MONOCYTES % (AUTO): 6.1 % (ref 0–7.3)
PLATELET # BLD: 285 K/MM3 (ref 140–440)
RBC # BLD AUTO: 4.89 M/MM3 (ref 3.65–5.03)

## 2021-11-04 PROCEDURE — 36415 COLL VENOUS BLD VENIPUNCTURE: CPT

## 2021-11-04 PROCEDURE — 85025 COMPLETE CBC W/AUTO DIFF WBC: CPT

## 2021-11-04 PROCEDURE — 96372 THER/PROPH/DIAG INJ SC/IM: CPT

## 2021-11-04 PROCEDURE — 99284 EMERGENCY DEPT VISIT MOD MDM: CPT

## 2021-11-04 PROCEDURE — 80048 BASIC METABOLIC PNL TOTAL CA: CPT

## 2021-11-04 PROCEDURE — 96365 THER/PROPH/DIAG IV INF INIT: CPT
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
## 2021-12-07 ENCOUNTER — HOSPITAL ENCOUNTER (EMERGENCY)
Dept: HOSPITAL 5 - ED | Age: 21
Discharge: HOME | End: 2021-12-07
Payer: SELF-PAY

## 2021-12-07 VITALS — SYSTOLIC BLOOD PRESSURE: 141 MMHG | DIASTOLIC BLOOD PRESSURE: 66 MMHG

## 2021-12-07 DIAGNOSIS — R56.9: Primary | ICD-10-CM

## 2021-12-07 DIAGNOSIS — Z79.899: ICD-10-CM

## 2021-12-07 DIAGNOSIS — Z87.820: ICD-10-CM

## 2021-12-07 LAB
ALBUMIN SERPL-MCNC: 4.8 G/DL (ref 3.9–5)
ALT SERPL-CCNC: 23 UNITS/L (ref 7–56)
BASOPHILS # (AUTO): 0.1 K/MM3 (ref 0–0.1)
BASOPHILS NFR BLD AUTO: 0.9 % (ref 0–1.8)
BUN SERPL-MCNC: 7 MG/DL (ref 9–20)
BUN/CREAT SERPL: 6 %
CALCIUM SERPL-MCNC: 9.3 MG/DL (ref 8.4–10.2)
EOSINOPHIL # BLD AUTO: 0.1 K/MM3 (ref 0–0.4)
EOSINOPHIL NFR BLD AUTO: 1.2 % (ref 0–4.3)
HCT VFR BLD CALC: 41.7 % (ref 35.5–45.6)
HEMOLYSIS INDEX: 27
HGB BLD-MCNC: 12.6 GM/DL (ref 11.8–15.2)
LYMPHOCYTES # BLD AUTO: 1 K/MM3 (ref 1.2–5.4)
LYMPHOCYTES NFR BLD AUTO: 14 % (ref 13.4–35)
MCHC RBC AUTO-ENTMCNC: 30 % (ref 32–34)
MCV RBC AUTO: 85 FL (ref 84–94)
MONOCYTES # (AUTO): 0.6 K/MM3 (ref 0–0.8)
MONOCYTES % (AUTO): 8.6 % (ref 0–7.3)
PLATELET # BLD: 262 K/MM3 (ref 140–440)
RBC # BLD AUTO: 4.91 M/MM3 (ref 3.65–5.03)

## 2021-12-07 PROCEDURE — 96374 THER/PROPH/DIAG INJ IV PUSH: CPT

## 2021-12-07 PROCEDURE — 80320 DRUG SCREEN QUANTALCOHOLS: CPT

## 2021-12-07 PROCEDURE — 99284 EMERGENCY DEPT VISIT MOD MDM: CPT

## 2021-12-07 PROCEDURE — 96361 HYDRATE IV INFUSION ADD-ON: CPT

## 2021-12-07 PROCEDURE — 36415 COLL VENOUS BLD VENIPUNCTURE: CPT

## 2021-12-07 PROCEDURE — G0480 DRUG TEST DEF 1-7 CLASSES: HCPCS

## 2021-12-07 PROCEDURE — 82550 ASSAY OF CK (CPK): CPT

## 2021-12-07 PROCEDURE — 85025 COMPLETE CBC W/AUTO DIFF WBC: CPT

## 2021-12-07 PROCEDURE — 93005 ELECTROCARDIOGRAM TRACING: CPT

## 2021-12-07 PROCEDURE — 80053 COMPREHEN METABOLIC PANEL: CPT
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
## 2021-12-09 NOTE — ELECTROCARDIOGRAPH REPORT
[facility]

                                       

Test Date:    2021               Test Time:    09:04:27

Pat Name:     KAELA HARRIS          Department:   

Patient ID:   [id number]          Room:          

Gender:       M                        Technician:   WILLIAM

:          2000               Requested By: RICARDO BAHENA

Order Number: [id number]              Reading MD:   Shahid Peralta

                                 Measurements

Intervals                              Axis          

Rate:         72                       P:            

SD:                                    QRS:          73

QRSD:         100                      T:            58

QT:           370                                    

QTc:          404                                    

                           Interpretive Statements

Normal sinus rhythm

Early repolarization ST changes

Compared to ECG 06/15/2021 04:36:48

No significant change

Electronically Signed On 2021 10:14:40 EST by Shahid Peralta

## 2022-06-20 ENCOUNTER — HOSPITAL ENCOUNTER (EMERGENCY)
Dept: HOSPITAL 5 - ED | Age: 22
Discharge: HOME | End: 2022-06-20
Payer: SELF-PAY

## 2022-06-20 VITALS — DIASTOLIC BLOOD PRESSURE: 70 MMHG | SYSTOLIC BLOOD PRESSURE: 118 MMHG

## 2022-06-20 DIAGNOSIS — R56.9: Primary | ICD-10-CM

## 2022-06-20 DIAGNOSIS — Z98.890: ICD-10-CM

## 2022-06-20 LAB
BASOPHILS # (AUTO): 0 K/MM3 (ref 0–0.1)
BASOPHILS NFR BLD AUTO: 0.6 % (ref 0–1.8)
BUN SERPL-MCNC: 15 MG/DL (ref 9–20)
BUN/CREAT SERPL: 12 %
CALCIUM SERPL-MCNC: 9.3 MG/DL (ref 8.4–10.2)
EOSINOPHIL # BLD AUTO: 0.2 K/MM3 (ref 0–0.4)
EOSINOPHIL NFR BLD AUTO: 2.6 % (ref 0–4.3)
HCT VFR BLD CALC: 42.5 % (ref 35.5–45.6)
HEMOLYSIS INDEX: 215
HGB BLD-MCNC: 13.4 GM/DL (ref 11.8–15.2)
LYMPHOCYTES # BLD AUTO: 2.2 K/MM3 (ref 1.2–5.4)
LYMPHOCYTES NFR BLD AUTO: 29.7 % (ref 13.4–35)
MCHC RBC AUTO-ENTMCNC: 32 % (ref 32–34)
MCV RBC AUTO: 88 FL (ref 84–94)
MONOCYTES # (AUTO): 0.6 K/MM3 (ref 0–0.8)
MONOCYTES % (AUTO): 8.1 % (ref 0–7.3)
PLATELET # BLD: 241 K/MM3 (ref 140–440)
RBC # BLD AUTO: 4.84 M/MM3 (ref 3.65–5.03)

## 2022-06-20 PROCEDURE — 36415 COLL VENOUS BLD VENIPUNCTURE: CPT

## 2022-06-20 PROCEDURE — 96374 THER/PROPH/DIAG INJ IV PUSH: CPT

## 2022-06-20 PROCEDURE — 83735 ASSAY OF MAGNESIUM: CPT

## 2022-06-20 PROCEDURE — 85025 COMPLETE CBC W/AUTO DIFF WBC: CPT

## 2022-06-20 PROCEDURE — 80048 BASIC METABOLIC PNL TOTAL CA: CPT

## 2022-06-20 PROCEDURE — 99283 EMERGENCY DEPT VISIT LOW MDM: CPT

## 2022-06-20 PROCEDURE — 96375 TX/PRO/DX INJ NEW DRUG ADDON: CPT
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
